# Patient Record
Sex: FEMALE | Race: BLACK OR AFRICAN AMERICAN | NOT HISPANIC OR LATINO
[De-identification: names, ages, dates, MRNs, and addresses within clinical notes are randomized per-mention and may not be internally consistent; named-entity substitution may affect disease eponyms.]

---

## 2017-06-12 ENCOUNTER — MEDICATION RENEWAL (OUTPATIENT)
Age: 67
End: 2017-06-12

## 2018-01-10 ENCOUNTER — APPOINTMENT (OUTPATIENT)
Dept: INTERNAL MEDICINE | Facility: CLINIC | Age: 68
End: 2018-01-10

## 2019-02-05 ENCOUNTER — APPOINTMENT (OUTPATIENT)
Dept: INTERNAL MEDICINE | Facility: CLINIC | Age: 69
End: 2019-02-05
Payer: SELF-PAY

## 2019-02-05 VITALS
HEIGHT: 64.5 IN | BODY MASS INDEX: 32.04 KG/M2 | HEART RATE: 66 BPM | OXYGEN SATURATION: 98 % | DIASTOLIC BLOOD PRESSURE: 90 MMHG | SYSTOLIC BLOOD PRESSURE: 142 MMHG | WEIGHT: 190 LBS

## 2019-02-05 PROCEDURE — 99213 OFFICE O/P EST LOW 20 MIN: CPT

## 2019-02-05 RX ORDER — CHLORHEXIDINE GLUCONATE 4 %
LIQUID (ML) TOPICAL DAILY
Qty: 100 | Refills: 0 | Status: ACTIVE | COMMUNITY
Start: 2019-02-05 | End: 1900-01-01

## 2019-02-06 LAB
ANION GAP SERPL CALC-SCNC: 10 MMOL/L
BUN SERPL-MCNC: 11 MG/DL
CALCIUM SERPL-MCNC: 9.8 MG/DL
CHLORIDE SERPL-SCNC: 106 MMOL/L
CO2 SERPL-SCNC: 28 MMOL/L
CREAT SERPL-MCNC: 1.01 MG/DL
GLUCOSE SERPL-MCNC: 83 MG/DL
HBA1C MFR BLD HPLC: 5.3 %
POTASSIUM SERPL-SCNC: 4.3 MMOL/L
SODIUM SERPL-SCNC: 144 MMOL/L

## 2019-02-06 NOTE — REVIEW OF SYSTEMS
[Joint Pain] : joint pain [Back Pain] : back pain [Negative] : Heme/Lymph [Chest Pain] : no chest pain [Palpitations] : no palpitations

## 2019-02-06 NOTE — HISTORY OF PRESENT ILLNESS
[FreeTextEntry1] : Last seen here in office Sept 2016.  \par Out of meds for BP for quite some time.\par Was recently seen at urgent care - BP at that visit was quite high. \par  [de-identified] : Has had a difficult time since last visit.  Multiple family stressors. Also did not have health insurance for some time. Only recently has it been reinstated, and even now, is not sure.  \par Has only been taking metoprolol since she had some left over.  \par

## 2019-02-06 NOTE — PHYSICAL EXAM
[No Acute Distress] : no acute distress [Well-Appearing] : well-appearing [EOMI] : extraocular movements intact [Normal Outer Ear/Nose] : the outer ears and nose were normal in appearance [Normal Oropharynx] : the oropharynx was normal [No Lymphadenopathy] : no lymphadenopathy [No Respiratory Distress] : no respiratory distress  [Clear to Auscultation] : lungs were clear to auscultation bilaterally [Normal Rate] : normal rate  [Regular Rhythm] : with a regular rhythm [Pedal Pulses Present] : the pedal pulses are present [No Edema] : there was no peripheral edema [Soft] : abdomen soft [Non Tender] : non-tender [Normal Anterior Cervical Nodes] : no anterior cervical lymphadenopathy [No CVA Tenderness] : no CVA  tenderness [No Spinal Tenderness] : no spinal tenderness [Grossly Normal Strength/Tone] : grossly normal strength/tone [No Rash] : no rash [Normal] : normal texture and mobility [Coordination Grossly Intact] : coordination grossly intact [No Focal Deficits] : no focal deficits [Deep Tendon Reflexes (DTR)] : deep tendon reflexes were 2+ and symmetric [Normal Affect] : the affect was normal

## 2019-02-06 NOTE — PLAN
[FreeTextEntry1] : Needs to return for CPE - needs primary care screening and to address other concerns.  \par Deferring ordering these tests pending possible supplementary insurance\par Referred to Main hospital to provide assistance with gaps in her insurance - determine what services she is eligible for and help her obtain additional coverage. \par Scheduled CPE

## 2019-02-26 ENCOUNTER — APPOINTMENT (OUTPATIENT)
Dept: INTERNAL MEDICINE | Facility: CLINIC | Age: 69
End: 2019-02-26

## 2019-04-05 ENCOUNTER — APPOINTMENT (OUTPATIENT)
Dept: INTERNAL MEDICINE | Facility: CLINIC | Age: 69
End: 2019-04-05

## 2019-05-25 ENCOUNTER — RX RENEWAL (OUTPATIENT)
Age: 69
End: 2019-05-25

## 2019-05-25 RX ORDER — MULTIVITAMIN WITH FOLIC ACID 400 MCG
TABLET ORAL
Qty: 100 | Refills: 3 | Status: ACTIVE | COMMUNITY
Start: 2019-05-25 | End: 1900-01-01

## 2019-06-22 ENCOUNTER — RX RENEWAL (OUTPATIENT)
Age: 69
End: 2019-06-22

## 2019-07-05 ENCOUNTER — RX RENEWAL (OUTPATIENT)
Age: 69
End: 2019-07-05

## 2019-07-10 ENCOUNTER — EMERGENCY (EMERGENCY)
Facility: HOSPITAL | Age: 69
LOS: 1 days | Discharge: HOME | End: 2019-07-10
Attending: EMERGENCY MEDICINE | Admitting: EMERGENCY MEDICINE
Payer: MEDICARE

## 2019-07-10 VITALS
RESPIRATION RATE: 18 BRPM | OXYGEN SATURATION: 100 % | HEART RATE: 78 BPM | SYSTOLIC BLOOD PRESSURE: 163 MMHG | DIASTOLIC BLOOD PRESSURE: 87 MMHG | TEMPERATURE: 98 F

## 2019-07-10 PROCEDURE — 12002 RPR S/N/AX/GEN/TRNK2.6-7.5CM: CPT | Mod: RT

## 2019-07-10 PROCEDURE — 99282 EMERGENCY DEPT VISIT SF MDM: CPT | Mod: 25

## 2019-07-12 PROBLEM — I10 ESSENTIAL (PRIMARY) HYPERTENSION: Chronic | Status: ACTIVE | Noted: 2019-07-10

## 2019-07-16 ENCOUNTER — APPOINTMENT (OUTPATIENT)
Dept: INTERNAL MEDICINE | Facility: CLINIC | Age: 69
End: 2019-07-16
Payer: MEDICARE

## 2019-07-16 VITALS
DIASTOLIC BLOOD PRESSURE: 80 MMHG | HEIGHT: 64.5 IN | SYSTOLIC BLOOD PRESSURE: 160 MMHG | BODY MASS INDEX: 32.72 KG/M2 | HEART RATE: 79 BPM | WEIGHT: 194 LBS | OXYGEN SATURATION: 99 %

## 2019-07-16 PROCEDURE — 99213 OFFICE O/P EST LOW 20 MIN: CPT

## 2019-07-19 NOTE — PLAN
[FreeTextEntry1] : f/u with ER as advised to have sutures removed \par check with PCP Dr Bell to see if TD was given recently, otherwise will need Td\par CPE with  Dr Bell at next available appt

## 2019-07-19 NOTE — HISTORY OF PRESENT ILLNESS
[FreeTextEntry8] : Sustained laceration on the right calf over a week ago with sutures in place.\par Did not get tetanus as she believes she got Td in 2/5/2019 by her PCP.\par Unable to confirm on records and will refer to PCP to see if Td was given.

## 2019-07-19 NOTE — PHYSICAL EXAM
[Well Nourished] : well nourished [de-identified] : 4cm laceration on medial right calf with sutres in place

## 2019-07-31 ENCOUNTER — EMERGENCY (EMERGENCY)
Facility: HOSPITAL | Age: 69
LOS: 1 days | Discharge: HOME | End: 2019-07-31
Admitting: EMERGENCY MEDICINE
Payer: MEDICARE

## 2019-07-31 VITALS
SYSTOLIC BLOOD PRESSURE: 161 MMHG | RESPIRATION RATE: 16 BRPM | HEART RATE: 66 BPM | OXYGEN SATURATION: 100 % | TEMPERATURE: 98 F | DIASTOLIC BLOOD PRESSURE: 68 MMHG

## 2019-07-31 PROCEDURE — 99283 EMERGENCY DEPT VISIT LOW MDM: CPT

## 2019-07-31 RX ORDER — CEPHALEXIN 500 MG
1 CAPSULE ORAL
Qty: 10 | Refills: 0
Start: 2019-07-31 | End: 2019-08-04

## 2019-07-31 NOTE — ED PROVIDER NOTE - OBJECTIVE STATEMENT
67 y/o F PMHx HTN here for suture removal. Pt states she fell on 7/10/19 and had 6 stitches placed to the R shin. Was told to return in 1-2 weeks for suture removal, but states her daughter was unable to bring her sooner. This past week, pt developed redness and purulent drainage to the wound, so went to [facility] and was diagnosed w/ cellulitis. Pt has since started taking Keflex, which helped improve the pain and redness. Took a total of 5 days, last dose this morning. Denies fevers, chills, calf pain or any other complaints. Able to ambulate.

## 2019-07-31 NOTE — ED PROVIDER NOTE - NSFOLLOWUPINSTRUCTIONS_ED_ALL_ED_FT
Follow up with your PMD within 48-72 hours for wound check. Keep wound covered and dry, clean with soap and water daily.  Apply bacitracin and cover.  Take Keflex 1 tablet twice a day for 5 more days. Any increased pain, redness, streaking (red lines), swelling, fever, chills return to ER.

## 2019-07-31 NOTE — ED PROVIDER NOTE - CLINICAL SUMMARY MEDICAL DECISION MAKING FREE TEXT BOX
69 y/o F here for suture removal of a laceration that was sutured 3 weeks ago, reports that she was treated w/ keflex for 5 days (last dose this am) for cellulitis. States the wound has already improved. On exam minimal dehiscence noted to distal point of laceration, still has mild surrounding erythema. Will treat w/ an additional 5 days of keflex, pmd follow up until resolution.

## 2019-07-31 NOTE — ED PROVIDER NOTE - PHYSICAL EXAMINATION
R shin w/ a 4cm laceration w/ 6 simple interrupted sutures in place, distal point of laceration w/ mild dehiscence but no active drainage, surrounding 1 cm area of erythema and warmth, no red streaking, no calf tenderness

## 2019-07-31 NOTE — ED PROVIDER NOTE - NS ED ROS FT
ROS:  GENERAL: No fever, no chills  EYES: no change in vision  HEENT: no trouble swallowing, no trouble speaking  CARDIAC: no chest pain  PULMONARY: no cough, no shortness of breath  GI: no abdominal pain, no nausea, no vomiting, no diarrhea, no constipation  : No dysuria, no frequency, no change in appearance, or odor of urine  SKIN: +healed laceration, +redness  NEURO: no headache, no weakness  MSK: No joint pain

## 2019-08-29 ENCOUNTER — APPOINTMENT (OUTPATIENT)
Dept: INTERNAL MEDICINE | Facility: CLINIC | Age: 69
End: 2019-08-29

## 2019-09-10 ENCOUNTER — APPOINTMENT (OUTPATIENT)
Dept: INTERNAL MEDICINE | Facility: CLINIC | Age: 69
End: 2019-09-10

## 2019-09-12 ENCOUNTER — RX RENEWAL (OUTPATIENT)
Age: 69
End: 2019-09-12

## 2019-10-13 ENCOUNTER — RX RENEWAL (OUTPATIENT)
Age: 69
End: 2019-10-13

## 2020-03-10 ENCOUNTER — APPOINTMENT (OUTPATIENT)
Dept: INTERNAL MEDICINE | Facility: CLINIC | Age: 70
End: 2020-03-10

## 2020-04-23 ENCOUNTER — APPOINTMENT (OUTPATIENT)
Dept: INTERNAL MEDICINE | Facility: CLINIC | Age: 70
End: 2020-04-23

## 2020-04-23 ENCOUNTER — APPOINTMENT (OUTPATIENT)
Dept: INTERNAL MEDICINE | Facility: CLINIC | Age: 70
End: 2020-04-23
Payer: MEDICARE

## 2020-04-23 VITALS — SYSTOLIC BLOOD PRESSURE: 137 MMHG | RESPIRATION RATE: 14 BRPM | DIASTOLIC BLOOD PRESSURE: 72 MMHG | HEART RATE: 64 BPM

## 2020-04-23 DIAGNOSIS — J45.909 UNSPECIFIED ASTHMA, UNCOMPLICATED: ICD-10-CM

## 2020-04-23 PROCEDURE — 99213 OFFICE O/P EST LOW 20 MIN: CPT | Mod: 95

## 2020-04-23 NOTE — PHYSICAL EXAM
[No Acute Distress] : no acute distress [Well-Appearing] : well-appearing [Normal Sclera/Conjunctiva] : normal sclera/conjunctiva [EOMI] : extraocular movements intact [Normal Outer Ear/Nose] : the outer ears and nose were normal in appearance [No Respiratory Distress] : no respiratory distress  [No Accessory Muscle Use] : no accessory muscle use [Normal Rate] : normal rate  [Grossly Normal Strength/Tone] : grossly normal strength/tone [No Rash] : no rash [Coordination Grossly Intact] : coordination grossly intact [No Focal Deficits] : no focal deficits [Normal Affect] : the affect was normal [Normal Insight/Judgement] : insight and judgment were intact

## 2020-04-23 NOTE — REVIEW OF SYSTEMS
[Wheezing] : wheezing [Cough] : cough [Joint Pain] : joint pain [Back Pain] : back pain [Negative] : Psychiatric [Fever] : no fever [Chills] : no chills [Dyspnea on Exertion] : no dyspnea on exertion [FreeTextEntry9] : see hpi

## 2020-04-23 NOTE — HISTORY OF PRESENT ILLNESS
[Home] : at home, [unfilled] , at the time of the visit. [Patient] : the patient [Medical Office: ([facility])___] : at the medical office located in  [de-identified] : Visit requested by patient to address worsening asthma]. An in-person visit is not advisable due to the current stay-at-home directive across [facility] during the COVID-19 State of Emergency, so TeleHealth visit was initiated and is appropriate to care for this patient.  \katia \katia Reports she has been wheezing since December. + dry cough- worse at night. No fever. No CP or chest tightness, no JONES. She has been using albuterol MDI.  Has not had refill of symbacort since the last time she was in the office which was last July. \katia Has not been sheltering as home.  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 She does have PPE but there are COVID + and COVID negative areas of the facility.  \katia Also reports her knee and back bothering her somewhat chronically - she has been using Tylenol PRN - would like xrays but prefers to wait until the worst of the pandemic has passed before proceeding with w/u.

## 2020-04-23 NOTE — DATA REVIEWED
[FreeTextEntry1] : has been tracking BP:\par 137/72\par 147/77\par 142/77\par \par over past few days

## 2020-04-23 NOTE — PLAN
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

## 2020-05-05 ENCOUNTER — APPOINTMENT (OUTPATIENT)
Dept: INTERNAL MEDICINE | Facility: CLINIC | Age: 70
End: 2020-05-05
Payer: MEDICARE

## 2020-05-05 DIAGNOSIS — M25.569 PAIN IN UNSPECIFIED KNEE: ICD-10-CM

## 2020-05-05 PROCEDURE — 99213 OFFICE O/P EST LOW 20 MIN: CPT | Mod: 95

## 2020-05-05 NOTE — PHYSICAL EXAM
[Well-Appearing] : well-appearing [No Acute Distress] : no acute distress [EOMI] : extraocular movements intact [Normal Sclera/Conjunctiva] : normal sclera/conjunctiva [Normal Outer Ear/Nose] : the outer ears and nose were normal in appearance [No Accessory Muscle Use] : no accessory muscle use [No Rash] : no rash [No Respiratory Distress] : no respiratory distress  [Coordination Grossly Intact] : coordination grossly intact [Normal Affect] : the affect was normal

## 2020-05-05 NOTE — REVIEW OF SYSTEMS
[Joint Pain] : joint pain [Back Pain] : back pain [Negative] : Heme/Lymph [FreeTextEntry6] : see hpi - less wheezing [FreeTextEntry9] : see hpi - both knee and back pain have improved significantly since starting topical NSAID

## 2020-05-05 NOTE — HISTORY OF PRESENT ILLNESS
[Home] : at home, [unfilled] , at the time of the visit. [Medical Office: ([facility])___] : at the medical office located in  [Other:____] : [unfilled] [Patient] : the patient [de-identified] : Visit scheduled by patient to f/u on asthma, joint pains. An in-person visit is not advisable due to the current stay-at-home directive across [facility] during the COVID-19 State of Emergency, so TeleHealth visit was initiated and is appropriate to care for this patient.  \par  \par Last visit, had been having increased wheezing.  Works in SNF with some COVID + patients.  Had not been using Symbicort so it was renewed. \par also having LBP and knee pain - sent in diclofenac to try\par has been monitoring her BP's\par \par Ms Ceja reports that since she started symbicort, her breathing has been much, much better. Less fatigue after she works a shift and needing to use Ventolin less often. \par Also reports that the diclofenac has helped with her knee pain and back pain significantly.  If she uses it at bedtime, wakes up without pain, and noticing she can stand up straighter at work and get around easier.\par

## 2020-08-04 ENCOUNTER — APPOINTMENT (OUTPATIENT)
Dept: INTERNAL MEDICINE | Facility: CLINIC | Age: 70
End: 2020-08-04
Payer: MEDICARE

## 2020-08-04 VITALS — DIASTOLIC BLOOD PRESSURE: 85 MMHG | SYSTOLIC BLOOD PRESSURE: 140 MMHG

## 2020-08-04 DIAGNOSIS — M19.90 UNSPECIFIED OSTEOARTHRITIS, UNSPECIFIED SITE: ICD-10-CM

## 2020-08-04 PROCEDURE — 99443: CPT

## 2020-08-04 NOTE — HISTORY OF PRESENT ILLNESS
[Home] : at home, [unfilled] , at the time of the visit. [Verbal consent obtained from patient] : the patient, [unfilled] [de-identified] : She is happy to report that her BP has been really good lately.  She had an interview with her ins co and they will now be providing mail order coverage for all 4 BP medications without any cost to her. She is quite pleased that her access to her medication will not be an issue anymore. 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 Unfortunately, multiple residents of the facility passed of COVID infection - she thinks 19 in all.  A few employees got sick as well but they are no longer working there. \par She says everyone has been negative for the past 6 weeks.

## 2020-09-10 ENCOUNTER — RX RENEWAL (OUTPATIENT)
Age: 70
End: 2020-09-10

## 2020-10-13 ENCOUNTER — APPOINTMENT (OUTPATIENT)
Dept: OBGYN | Facility: CLINIC | Age: 70
End: 2020-10-13
Payer: MEDICARE

## 2020-10-13 ENCOUNTER — LABORATORY RESULT (OUTPATIENT)
Age: 70
End: 2020-10-13

## 2020-10-13 VITALS
SYSTOLIC BLOOD PRESSURE: 142 MMHG | DIASTOLIC BLOOD PRESSURE: 90 MMHG | HEIGHT: 64.5 IN | WEIGHT: 198 LBS | BODY MASS INDEX: 33.39 KG/M2

## 2020-10-13 DIAGNOSIS — Z01.419 ENCOUNTER FOR GYNECOLOGICAL EXAMINATION (GENERAL) (ROUTINE) W/OUT ABNORMAL FINDINGS: ICD-10-CM

## 2020-10-13 DIAGNOSIS — R92.8 OTHER ABNORMAL AND INCONCLUSIVE FINDINGS ON DIAGNOSTIC IMAGING OF BREAST: ICD-10-CM

## 2020-10-13 DIAGNOSIS — Z87.898 PERSONAL HISTORY OF OTHER SPECIFIED CONDITIONS: ICD-10-CM

## 2020-10-13 DIAGNOSIS — Z12.11 ENCOUNTER FOR SCREENING FOR MALIGNANT NEOPLASM OF COLON: ICD-10-CM

## 2020-10-13 DIAGNOSIS — S81.811S LACERATION W/OUT FOREIGN BODY, RIGHT LOWER LEG, SEQUELA: ICD-10-CM

## 2020-10-13 DIAGNOSIS — R68.89 OTHER GENERAL SYMPTOMS AND SIGNS: ICD-10-CM

## 2020-10-13 PROCEDURE — 99397 PER PM REEVAL EST PAT 65+ YR: CPT

## 2020-10-13 RX ORDER — DICLOFENAC SODIUM 10 MG/G
1 GEL TOPICAL
Qty: 1 | Refills: 2 | Status: DISCONTINUED | COMMUNITY
Start: 2020-04-23 | End: 2020-10-13

## 2020-10-13 NOTE — PLAN
[FreeTextEntry1] : Previous breast biopsy in 2015 -needs mammo follow up\par BP elevated today-has not yet taken medication\par Cystocele large -will send to urogyn for pessary evaluation as she has had good success in the past. However , it might have gotten larger and I do not know how well it will stay in

## 2020-10-13 NOTE — HISTORY OF PRESENT ILLNESS
[Patient reported mammogram was abnormal] : Patient reported mammogram was abnormal [Patient reported PAP Smear was normal] : Patient reported PAP Smear was normal [FreeTextEntry1] : Patient with prolapse \par She used pessary\par but has stopped for the past 3 months\par It falls out on its own and she might want a new one  [Mammogramdate] : 2015 [TextBox_19] : negative breast biopsy at that time and no follow up  [PapSmeardate] : 2015 [ColonoscopyDate] : 2013

## 2020-10-13 NOTE — PHYSICAL EXAM
[Appropriately responsive] : appropriately responsive [Alert] : alert [No Acute Distress] : no acute distress [No Lymphadenopathy] : no lymphadenopathy [Regular Rate Rhythm] : regular rate rhythm [No Murmurs] : no murmurs [Clear to Auscultation B/L] : clear to auscultation bilaterally [Soft] : soft [Non-tender] : non-tender [Non-distended] : non-distended [No HSM] : No HSM [No Lesions] : no lesions [No Mass] : no mass [Oriented x3] : oriented x3 [Examination Of The Breasts] : a normal appearance [No Masses] : no breast masses were palpable [Labia Majora] : normal [Labia Minora] : normal [Cystocele] : a cystocele [Normal] : normal [Uterine Adnexae] : normal [FreeTextEntry4] : Large cystocele visible in the introitus

## 2020-10-28 ENCOUNTER — NON-APPOINTMENT (OUTPATIENT)
Age: 70
End: 2020-10-28

## 2020-11-03 LAB — CYTOLOGY CVX/VAG DOC THIN PREP: ABNORMAL

## 2020-11-09 ENCOUNTER — NON-APPOINTMENT (OUTPATIENT)
Age: 70
End: 2020-11-09

## 2020-11-10 ENCOUNTER — APPOINTMENT (OUTPATIENT)
Dept: INTERNAL MEDICINE | Facility: CLINIC | Age: 70
End: 2020-11-10
Payer: MEDICARE

## 2020-11-10 ENCOUNTER — NON-APPOINTMENT (OUTPATIENT)
Age: 70
End: 2020-11-10

## 2020-11-10 VITALS
BODY MASS INDEX: 34.73 KG/M2 | HEART RATE: 62 BPM | OXYGEN SATURATION: 98 % | HEIGHT: 63 IN | SYSTOLIC BLOOD PRESSURE: 144 MMHG | WEIGHT: 196 LBS | DIASTOLIC BLOOD PRESSURE: 92 MMHG

## 2020-11-10 DIAGNOSIS — Z20.828 CONTACT WITH AND (SUSPECTED) EXPOSURE TO OTHER VIRAL COMMUNICABLE DISEASES: ICD-10-CM

## 2020-11-10 DIAGNOSIS — Z71.89 OTHER SPECIFIED COUNSELING: ICD-10-CM

## 2020-11-10 DIAGNOSIS — G47.62 SLEEP RELATED LEG CRAMPS: ICD-10-CM

## 2020-11-10 DIAGNOSIS — M54.5 LOW BACK PAIN: ICD-10-CM

## 2020-11-10 DIAGNOSIS — R09.82 POSTNASAL DRIP: ICD-10-CM

## 2020-11-10 PROCEDURE — G0444 DEPRESSION SCREEN ANNUAL: CPT | Mod: 59

## 2020-11-10 PROCEDURE — 99072 ADDL SUPL MATRL&STAF TM PHE: CPT

## 2020-11-10 PROCEDURE — 99497 ADVNCD CARE PLAN 30 MIN: CPT | Mod: 33

## 2020-11-10 PROCEDURE — G0439: CPT

## 2020-11-10 PROCEDURE — G0442 ANNUAL ALCOHOL SCREEN 15 MIN: CPT | Mod: 59

## 2020-11-10 PROCEDURE — 93000 ELECTROCARDIOGRAM COMPLETE: CPT | Mod: 59

## 2020-11-10 NOTE — ASSESSMENT
[FreeTextEntry1] : Discussed advanced directives, Health Care Proxy, and goals of care during visit. \par \par Annual alcohol screen completed this visit. Alcohol use within healthy limits.  Healthy drinking guidelines shared with patient (Female and male overage 65 no more than 3 SSD on any day, no more than 7 per week). Positive reinforcement provided given patient currently within healthy guidelines. \par \par Annual depression screen completed and reviewed.  Screening not suggestive of diagnosis of MDD.

## 2020-11-10 NOTE — REVIEW OF SYSTEMS
[Incontinence] : incontinence [Frequency] : frequency [Joint Pain] : joint pain [Back Pain] : back pain [Negative] : Heme/Lymph [FreeTextEntry6] : coughs at night - feels tickle in her throat when she lies down, with some phlegm intermittently during the night

## 2020-11-10 NOTE — PHYSICAL EXAM
[No Acute Distress] : no acute distress [Well-Appearing] : well-appearing [Normal Sclera/Conjunctiva] : normal sclera/conjunctiva [PERRL] : pupils equal round and reactive to light [EOMI] : extraocular movements intact [Normal Outer Ear/Nose] : the outer ears and nose were normal in appearance [Normal Oropharynx] : the oropharynx was normal [No Lymphadenopathy] : no lymphadenopathy [Supple] : supple [No Respiratory Distress] : no respiratory distress  [No Accessory Muscle Use] : no accessory muscle use [Clear to Auscultation] : lungs were clear to auscultation bilaterally [Regular Rhythm] : with a regular rhythm [Normal S1, S2] : normal S1 and S2 [No Murmur] : no murmur heard [Pedal Pulses Present] : the pedal pulses are present [No Edema] : there was no peripheral edema [Normal Appearance] : normal in appearance [No Masses] : no palpable masses [No Axillary Lymphadenopathy] : no axillary lymphadenopathy [Soft] : abdomen soft [Non Tender] : non-tender [Normal Bowel Sounds] : normal bowel sounds [Normal Axillary Nodes] : no axillary lymphadenopathy [Normal Posterior Cervical Nodes] : no posterior cervical lymphadenopathy [Normal Anterior Cervical Nodes] : no anterior cervical lymphadenopathy [No CVA Tenderness] : no CVA  tenderness [No Spinal Tenderness] : no spinal tenderness [No Joint Swelling] : no joint swelling [Grossly Normal Strength/Tone] : grossly normal strength/tone [No Rash] : no rash [Normal] : normal texture and mobility [Coordination Grossly Intact] : coordination grossly intact [Deep Tendon Reflexes (DTR)] : deep tendon reflexes were 2+ and symmetric [Normal Affect] : the affect was normal [Normal Insight/Judgement] : insight and judgment were intact [de-identified] : HR slow [de-identified] : no skin changes

## 2020-11-10 NOTE — HISTORY OF PRESENT ILLNESS
[de-identified] : doing well - has not been sick since she has been very careful at work\par Has bladder issues as well as urge incontinence - has appt to see urologist on 11/26 RE pessary\par to make appt for mammo\par \par c/o pain in waist, shoulder, and leg cramps - she is concerned may have to retire soon due to the waist and shoulder pain.  The leg cramps are nocturnal only - bilateral calves. Can be very painful - occasionally daughter has to get up in the middle of the night to massage them away.\par \par

## 2020-11-10 NOTE — DISCUSSION/SUMMARY
[Subsequent Annual Wellness] : Subsequent Annual Wellness Visit [Preventive Exam & Counseling Completed] : with preventive exam as well as age and risk appropriate counseling completed [EKG] : EKG recommended [Healthy Diet] : counseling was given on maintaining a healthy diet [Healthy Weight] : counseling was given on maintaining a healthy weight [Improve Physical Activity] : counseling was given on ways to improve physical activity [Blood Glucose] : due for blood glucose [Screening Current] : screening is current [Screening Mammogram] : due for a screening mammogram [Calcium and Vitamin D Intake] : counseling was given on obtaining adequate amounts of calcium and vitamin D on a daily basis [Regular Weightbearing Exercise] : counseling was given on the importance of regular weightbearing exercise [Screening Not Indicated] : screening not indicated [Ophthalmologist Referral] : ophthalmologist referral [YAMILETH] : due for YAMILETH [Influenza UTD This Year] : influenza vaccine is up to date this year [Influenza Recommended Annually] : influenza vaccination is recommended annually [Lifetime Vaccine Completed] : the lifetime pneumococcal vaccine has been completed [Paperwork & Instructions Given] : paperwork and instructions were given to the patient [Encouraged to F/U to Discuss Questions/Decisions] : ~he/she~ was encouraged to follow-up with me to discuss ~his/her~ questions and/or decisions [Patient] : plan discussed with the patient [Follow-Up in ___] : follow-up visit needed in [unfilled]

## 2020-11-14 LAB
ALBUMIN SERPL ELPH-MCNC: 4.3 G/DL
ALP BLD-CCNC: 77 U/L
ALT SERPL-CCNC: 12 U/L
ANION GAP SERPL CALC-SCNC: 12 MMOL/L
AST SERPL-CCNC: 18 U/L
BASOPHILS # BLD AUTO: 0.03 K/UL
BASOPHILS NFR BLD AUTO: 0.5 %
BILIRUB SERPL-MCNC: 0.5 MG/DL
BUN SERPL-MCNC: 12 MG/DL
CALCIUM SERPL-MCNC: 10.1 MG/DL
CHLORIDE SERPL-SCNC: 102 MMOL/L
CHOLEST SERPL-MCNC: 186 MG/DL
CO2 SERPL-SCNC: 28 MMOL/L
CREAT SERPL-MCNC: 0.89 MG/DL
EOSINOPHIL # BLD AUTO: 0.22 K/UL
EOSINOPHIL NFR BLD AUTO: 4 %
ESTIMATED AVERAGE GLUCOSE: 114 MG/DL
GLUCOSE SERPL-MCNC: 83 MG/DL
HBA1C MFR BLD HPLC: 5.6 %
HCT VFR BLD CALC: 39.3 %
HDLC SERPL-MCNC: 54 MG/DL
HGB BLD-MCNC: 13 G/DL
IMM GRANULOCYTES NFR BLD AUTO: 0.4 %
LDLC SERPL CALC-MCNC: 120 MG/DL
LYMPHOCYTES # BLD AUTO: 2.07 K/UL
LYMPHOCYTES NFR BLD AUTO: 37.6 %
MAN DIFF?: NORMAL
MCHC RBC-ENTMCNC: 29.8 PG
MCHC RBC-ENTMCNC: 33.1 GM/DL
MCV RBC AUTO: 90.1 FL
MONOCYTES # BLD AUTO: 0.47 K/UL
MONOCYTES NFR BLD AUTO: 8.5 %
NEUTROPHILS # BLD AUTO: 2.69 K/UL
NEUTROPHILS NFR BLD AUTO: 49 %
NONHDLC SERPL-MCNC: 132 MG/DL
PLATELET # BLD AUTO: 236 K/UL
POTASSIUM SERPL-SCNC: 3.9 MMOL/L
PROT SERPL-MCNC: 7.5 G/DL
RBC # BLD: 4.36 M/UL
RBC # FLD: 13.2 %
SARS-COV-2 IGG SERPL IA-ACNC: 0.08 INDEX
SARS-COV-2 IGG SERPL QL IA: NEGATIVE
SODIUM SERPL-SCNC: 142 MMOL/L
TRIGL SERPL-MCNC: 61 MG/DL
TSH SERPL-ACNC: 1.05 UIU/ML
WBC # FLD AUTO: 5.5 K/UL

## 2020-11-24 ENCOUNTER — APPOINTMENT (OUTPATIENT)
Dept: UROGYNECOLOGY | Facility: CLINIC | Age: 70
End: 2020-11-24

## 2020-11-30 ENCOUNTER — APPOINTMENT (OUTPATIENT)
Dept: OBGYN | Facility: CLINIC | Age: 70
End: 2020-11-30

## 2020-12-23 PROBLEM — Z01.419 ENCOUNTER FOR GYNECOLOGICAL EXAMINATION: Status: RESOLVED | Noted: 2020-10-13 | Resolved: 2020-12-23

## 2021-01-12 ENCOUNTER — APPOINTMENT (OUTPATIENT)
Dept: OBGYN | Facility: CLINIC | Age: 71
End: 2021-01-12
Payer: MEDICARE

## 2021-01-12 VITALS
SYSTOLIC BLOOD PRESSURE: 146 MMHG | WEIGHT: 201 LBS | TEMPERATURE: 98 F | HEIGHT: 63 IN | DIASTOLIC BLOOD PRESSURE: 80 MMHG | BODY MASS INDEX: 35.61 KG/M2

## 2021-01-12 DIAGNOSIS — R87.610 ATYPICAL SQUAMOUS CELLS OF UNDETERMINED SIGNIFICANCE ON CYTOLOGIC SMEAR OF CERVIX (ASC-US): ICD-10-CM

## 2021-01-12 DIAGNOSIS — R87.810 ATYPICAL SQUAMOUS CELLS OF UNDETERMINED SIGNIFICANCE ON CYTOLOGIC SMEAR OF CERVIX (ASC-US): ICD-10-CM

## 2021-01-12 PROCEDURE — 57452 EXAM OF CERVIX W/SCOPE: CPT

## 2021-01-12 PROCEDURE — 99072 ADDL SUPL MATRL&STAF TM PHE: CPT

## 2021-01-12 NOTE — PROCEDURE
[Colposcopy] : Colposcopy  [Time out performed] : Pre-procedure time out performed.  Patient's name, date of birth and procedure confirmed. [Consent Obtained] : Consent obtained [Risks] : risks [Benefits] : benefits [Alternatives] : alternatives [Patient] : patient [Infection] : infection [Bleeding] : bleeding [Allergic Reaction] : allergic reaction [ASCUS] : ASCUS [HPV High Risk] : HPV high risk [Colposcopy Adequate] : colposcopy adequate [Pap Performed] : pap not performed [SCI Fully Visualized] : SCI fully visualized [ECC Performed] : ECC performed [No Abnormalities] : no abnormalities [Biopsy] : biopsy taken [Hemostasis Obtained] : Hemostasis obtained [Tolerated Well] : the patient tolerated the procedure well [de-identified] : positive 18/45 [de-identified] : 1` [de-identified] : 11 o'clock

## 2021-01-21 ENCOUNTER — NON-APPOINTMENT (OUTPATIENT)
Age: 71
End: 2021-01-21

## 2021-01-25 ENCOUNTER — NON-APPOINTMENT (OUTPATIENT)
Age: 71
End: 2021-01-25

## 2021-01-25 LAB — CORE LAB BIOPSY: NORMAL

## 2021-01-26 ENCOUNTER — APPOINTMENT (OUTPATIENT)
Dept: INTERNAL MEDICINE | Facility: CLINIC | Age: 71
End: 2021-01-26
Payer: MEDICARE

## 2021-01-26 VITALS — SYSTOLIC BLOOD PRESSURE: 140 MMHG | DIASTOLIC BLOOD PRESSURE: 88 MMHG

## 2021-01-26 VITALS
BODY MASS INDEX: 34.55 KG/M2 | SYSTOLIC BLOOD PRESSURE: 190 MMHG | DIASTOLIC BLOOD PRESSURE: 90 MMHG | OXYGEN SATURATION: 98 % | WEIGHT: 195 LBS | HEIGHT: 63 IN | HEART RATE: 64 BPM

## 2021-01-26 DIAGNOSIS — Z71.89 OTHER SPECIFIED COUNSELING: ICD-10-CM

## 2021-01-26 PROCEDURE — 99072 ADDL SUPL MATRL&STAF TM PHE: CPT

## 2021-01-26 PROCEDURE — 99214 OFFICE O/P EST MOD 30 MIN: CPT

## 2021-01-26 RX ORDER — HYDROCHLOROTHIAZIDE 25 MG/1
25 TABLET ORAL
Qty: 90 | Refills: 3 | Status: DISCONTINUED | COMMUNITY
Start: 2019-02-05 | End: 2021-01-26

## 2021-01-26 NOTE — REVIEW OF SYSTEMS
[Back Pain] : back pain [Negative] : Heme/Lymph [FreeTextEntry9] : see hpi [de-identified] : see hpi

## 2021-01-26 NOTE — HISTORY OF PRESENT ILLNESS
[FreeTextEntry1] : BP, HLD [de-identified] : Last visit, decreased her beta blocker due to bradycardia. Here for blood pressure check. She has been taking 50 mg of metoprolol (1/2 pill) since last visit.  she ays when she checks, her SBP still runs higher then desired - in the 140-150 range. \par \par c/o back pain at the level of her waist.  Chronic - causing difficulty at work - she finds it hard to stand for a long periods of time which is making her job at [employer] difficult.\par Reports recent visit to Derm - skin bx on hand  - benign

## 2021-01-26 NOTE — PHYSICAL EXAM
[Well-Appearing] : well-appearing [No Acute Distress] : no acute distress [EOMI] : extraocular movements intact [No Respiratory Distress] : no respiratory distress  [Clear to Auscultation] : lungs were clear to auscultation bilaterally [Regular Rhythm] : with a regular rhythm [Normal S1, S2] : normal S1 and S2 [Soft] : abdomen soft [Non Tender] : non-tender [Normal Anterior Cervical Nodes] : no anterior cervical lymphadenopathy [No CVA Tenderness] : no CVA  tenderness [No Spinal Tenderness] : no spinal tenderness [Grossly Normal Strength/Tone] : grossly normal strength/tone [No Rash] : no rash [No Focal Deficits] : no focal deficits [Deep Tendon Reflexes (DTR)] : deep tendon reflexes were 2+ and symmetric [Normal Affect] : the affect was normal

## 2021-01-26 NOTE — ASSESSMENT
[FreeTextEntry1] : covid vaccine as soon as available - she can likely schedule through her workplace

## 2021-02-02 ENCOUNTER — NON-APPOINTMENT (OUTPATIENT)
Age: 71
End: 2021-02-02

## 2021-02-02 ENCOUNTER — APPOINTMENT (OUTPATIENT)
Dept: PAIN MANAGEMENT | Facility: CLINIC | Age: 71
End: 2021-02-02
Payer: MEDICARE

## 2021-02-02 DIAGNOSIS — M79.10 MYALGIA, UNSPECIFIED SITE: ICD-10-CM

## 2021-02-02 PROCEDURE — 99204 OFFICE O/P NEW MOD 45 MIN: CPT | Mod: 95

## 2021-02-02 NOTE — CONSULT LETTER
[Dear  ___] : Dear  [unfilled], [Consult Letter:] : I had the pleasure of evaluating your patient, [unfilled]. [Please see my note below.] : Please see my note below. [Consult Closing:] : Thank you very much for allowing me to participate in the care of this patient.  If you have any questions, please do not hesitate to contact me. [Sincerely,] : Sincerely, [FreeTextEntry3] : Harpreet Schmitz MD\par

## 2021-02-02 NOTE — HISTORY OF PRESENT ILLNESS
[FreeTextEntry1] : \par Reason for Telehealth visit: Back pain\par \par This call took place with [facility] room on video. The patient and Dr. Schmitz were both able to see each other and communicate through video. Greater then 50% of the time spent in the encounter involved counseling and coordination of care.  70 yof presents for consultation with back pain. She reports one year of severe back pain. Pain radiates into the legs bilaterally at night, right greater then left. There is cramping and weakness. No relief w/ medications. Quality of life is impaired. Reports significant discomfort around her waist. Pain is improved with sitting. Worst with walking. Is aching and cramping.\par \par Time spent on visit: 30 minutes\par \par

## 2021-02-02 NOTE — ASSESSMENT
[FreeTextEntry1] : 70 yof presents for consultation with back pain. She reports one year of severe back pain. Pain radiates into the legs bilaterally at night, right greater then left. There is cramping and weakness. No relief w/ medications. Quality of life is impaired. The patient has failed to have relief with over six weeks of physical therapy within the last three months and all medications. GIven their failure to improve with all other conservative measures recommend MRI lumbar spine. Patient will return to review imaging and plan for potential intervention. Physical therapy. Start gabapentin 300 mg QHS. RTC to review imaging and consider intervention, likely ESEQUIEL. \par

## 2021-02-08 ENCOUNTER — APPOINTMENT (OUTPATIENT)
Dept: PAIN MANAGEMENT | Facility: CLINIC | Age: 71
End: 2021-02-08
Payer: MEDICARE

## 2021-02-08 PROCEDURE — 99443: CPT

## 2021-02-10 ENCOUNTER — APPOINTMENT (OUTPATIENT)
Dept: UROGYNECOLOGY | Facility: CLINIC | Age: 71
End: 2021-02-10
Payer: MEDICARE

## 2021-02-10 VITALS
BODY MASS INDEX: 33.12 KG/M2 | HEIGHT: 64 IN | SYSTOLIC BLOOD PRESSURE: 166 MMHG | WEIGHT: 194 LBS | DIASTOLIC BLOOD PRESSURE: 95 MMHG | HEART RATE: 59 BPM | TEMPERATURE: 98.6 F

## 2021-02-10 VITALS — DIASTOLIC BLOOD PRESSURE: 89 MMHG | SYSTOLIC BLOOD PRESSURE: 175 MMHG

## 2021-02-10 DIAGNOSIS — R35.0 FREQUENCY OF MICTURITION: ICD-10-CM

## 2021-02-10 LAB
BILIRUB UR QL STRIP: NORMAL
CLARITY UR: CLEAR
COLLECTION METHOD: NORMAL
GLUCOSE UR-MCNC: NORMAL
HCG UR QL: 0.2 EU/DL
HGB UR QL STRIP.AUTO: NORMAL
KETONES UR-MCNC: NORMAL
LEUKOCYTE ESTERASE UR QL STRIP: NORMAL
NITRITE UR QL STRIP: NORMAL
PH UR STRIP: 7
PROT UR STRIP-MCNC: NORMAL
SP GR UR STRIP: 1.02

## 2021-02-10 PROCEDURE — 81003 URINALYSIS AUTO W/O SCOPE: CPT | Mod: QW

## 2021-02-10 PROCEDURE — 99204 OFFICE O/P NEW MOD 45 MIN: CPT | Mod: 25

## 2021-02-10 PROCEDURE — 99072 ADDL SUPL MATRL&STAF TM PHE: CPT

## 2021-02-10 PROCEDURE — 51701 INSERT BLADDER CATHETER: CPT

## 2021-02-10 NOTE — HISTORY OF PRESENT ILLNESS
[Vaginal Wall Prolapse] : no [Rectal Prolapse] : no [Unable To Restrain Bowel Movement] : no [Urinary Frequency] : no [] : years ago [Urinary Tract Infection] : no [x3+] : nocturia three or more  times a night [FreeTextEntry3] : daily -  in order to help urinate [FreeTextEntry5] : daily [de-identified] : 3 [de-identified] : has used a pessary in the past which she used for a little less than 2 years - last used 6 months ago  - pt was doing self care [de-identified] : daily [de-identified] : with prolapse [de-identified] : + sexually acrive [FreeTextEntry1] : chart reviewed pt last seen by urogyn many years ago. \par ROS as per questionnaire.\par

## 2021-02-10 NOTE — PHYSICAL EXAM
[No Acute Distress] : in no acute distress [Well developed] : well developed [Well Nourished] : ~L well nourished [Normal Mood/Affect] : mood and affect are normal [Respirations regular] : ~T respiratory rate was regular [No Edema] : ~T edema was not present [Warm and Dry] : was warm and dry to touch [Normal Gait] : gait was normal [Vulvar Atrophy] : vulvar atrophy [Normal Appearance] : general appearance was normal [Atrophy] : atrophy [Cystocele] : a cystocele [Uterine Prolapse] : uterine prolapse [Aa ____] : Aa [unfilled] [Ba ____] : Ba [unfilled] [C ____] : C [unfilled] [GH ____] : GH [unfilled] [PB ____] : PB [unfilled] [TVL ____] : TVL  [unfilled] [Ap ____] : Ap [unfilled] [Bp ____] : Bp [unfilled] [D ____] : D [unfilled] [Normal] : normal [Uterine Adnexae] : were not tender and not enlarged [Post Void Residual ____ml] : post void residual was [unfilled] ml [Normal rectal exam] : was normal [Anxiety] : patient is not anxious [Mass (___ Cm)] : no ~M [unfilled] abdominal mass was palpated [Tenderness] : ~T no ~M abdominal tenderness observed [Distended] : not distended [Inguinal LAD] : no adenopathy was noted in the inguinal lymph nodes [FreeTextEntry3] : + hypermobility

## 2021-02-10 NOTE — DISCUSSION/SUMMARY
[FreeTextEntry1] : I reviewed the above findings with the patient and her daughter with visual illustrations. Treatment options for the prolapse were discussed and included doing nothing, Kegel exercises and behavioral modification, a pessary, or surgical correction.  for the prolapse. She is interested in having a pessary placed over 10 for such. We discussed that her blood pressure today was elevated. She was placed. The new blood pressure medication by her internist. However, she has not started as of yet. We discussed following up with Dr. MCGUIRE for her elevated blood pressures. She will followup here for her prolapse. IUD, a patient information of pelvic organ prolapse and pessary was given to her. All questions were answered.

## 2021-02-15 LAB — BACTERIA UR CULT: NORMAL

## 2021-03-02 ENCOUNTER — APPOINTMENT (OUTPATIENT)
Dept: UROGYNECOLOGY | Facility: CLINIC | Age: 71
End: 2021-03-02
Payer: MEDICARE

## 2021-03-02 VITALS — DIASTOLIC BLOOD PRESSURE: 71 MMHG | SYSTOLIC BLOOD PRESSURE: 176 MMHG

## 2021-03-02 VITALS
TEMPERATURE: 97.5 F | DIASTOLIC BLOOD PRESSURE: 82 MMHG | HEIGHT: 64 IN | BODY MASS INDEX: 32.78 KG/M2 | WEIGHT: 192 LBS | SYSTOLIC BLOOD PRESSURE: 190 MMHG

## 2021-03-02 PROCEDURE — 99213 OFFICE O/P EST LOW 20 MIN: CPT

## 2021-03-02 PROCEDURE — 99072 ADDL SUPL MATRL&STAF TM PHE: CPT

## 2021-03-02 NOTE — DISCUSSION/SUMMARY
[FreeTextEntry1] : 1. POP\par - Patient fitted with RS#5, patient states she was able to urinate more effectively with the pessary in place. \par - Patient was able to perform self care for pessary by taking pessary out and putting the pessary back in place without any difficulties. \par - Advised patient to use water-based lubricants to put pessary back in place\par - Will RTO in 2 weeks for pessary check or sooner if needed\par \par 2. HTN\par - Repeat BP taken in office\par - Advised patient to continue follow up with PMD for elevated BP\par \par Patient agrees to call office with any questions or concerns, verbalized understanding.

## 2021-03-02 NOTE — HISTORY OF PRESENT ILLNESS
[FreeTextEntry1] : Patient presents to office today for initial pessary fitting.  Patient states she used to have a ring pessary 1 year ago but it fell out and was flushed down the toilet.  States she was doing her own self pessary maintenance.  States without the pessary she feels as though she does not empty her bladder well.  Denies any bowel complaints.  Denies any pelvic pain, pressure or vaginal bleeding. \par \par Patients Blood pressure was elevated upon arrival to apt.  Patient states she took her BP medication last night.  States she is following up with her PMD for her elevated BP. Denies headache, dizziness, nausea or vomiting.

## 2021-03-02 NOTE — PROCEDURE
[Good Fit] : fits well [Pessary Inserted] : inserted [None] : no bleeding [Medication Review] : Medicaiton Review: Patient verbalizes understanding of risks and benefits [Fluid Management] : Fluid Management: patient verbalizes understanding 6-10 cups per day [Bowel Management] : Bowel Management: patient verbalizes understanding of daily dietary fiber intake [Bladder Training] : Bladder Training: Patient given information with verbal understanding [FreeTextEntry1] : IPE [de-identified] : Fit with RS #5, pessary remained intact with valsalva, walking around and using bathroom. Patient reports comfortable fit.  [FreeTextEntry8] : Educated patient how to take pessary out and put pessary back in place, Patient verbalized understanding and returned demonstation. Edcuated for proper PeriCare.

## 2021-03-02 NOTE — PHYSICAL EXAM
[No Acute Distress] : in no acute distress [Well developed] : well developed [Well Nourished] : ~L well nourished [Good Hygeine] : demonstrates good hygeine [Oriented x3] : oriented to person, place, and time [Normal Memory] : ~T memory was ~L unimpaired [Normal Mood/Affect] : mood and affect are normal [Warm and Dry] : was warm and dry to touch [Normal Gait] : gait was normal [Labia Majora] : were normal [Labia Minora] : were normal [Normal Appearance] : general appearance was normal [Cystocele] : a cystocele [Uterine Prolapse] : uterine prolapse [No Bleeding] : there was no active vaginal bleeding [Normal] : normal [Anxiety] : patient is not anxious [Tenderness] : ~T no ~M abdominal tenderness observed [Distended] : not distended [FreeTextEntry4] : prolapse visible at vaginal introitus

## 2021-03-09 ENCOUNTER — NON-APPOINTMENT (OUTPATIENT)
Age: 71
End: 2021-03-09

## 2021-03-24 NOTE — ED ADULT TRIAGE NOTE - AS O2 DELIVERY
Hi Dr. Patrick Lucas please see request to speak with PCP below. Per records PCP is out of the office.   Thank you,  Hannah Hughes RN room air

## 2021-03-25 ENCOUNTER — APPOINTMENT (OUTPATIENT)
Dept: UROGYNECOLOGY | Facility: CLINIC | Age: 71
End: 2021-03-25

## 2021-03-27 PROBLEM — M54.16 LUMBAR RADICULOPATHY, CHRONIC: Status: ACTIVE | Noted: 2021-02-02

## 2021-03-30 ENCOUNTER — APPOINTMENT (OUTPATIENT)
Dept: INTERNAL MEDICINE | Facility: CLINIC | Age: 71
End: 2021-03-30

## 2021-03-30 DIAGNOSIS — M54.16 RADICULOPATHY, LUMBAR REGION: ICD-10-CM

## 2021-04-04 NOTE — HISTORY OF PRESENT ILLNESS
[FreeTextEntry1] : patient cancelled visit - will invalidate note [de-identified] : RE HTN: Last visit, reduced B-blocker due to bradycardia - with lower dose of metoprolol (100 mg --> 50 mg) BP was mildly elevated.  Therefore, changed HCTZ to chlorthalidone for better control.  \par Here for BP check on new medication and to check lytes/Cr. \par \par RE HLD: 10 yr risk ASCVD 18% - need to address statin\par \par RE HCM: works in [employer], needs COVID vaccination \par \par RE LBP: w/u in progress with Dr Rico

## 2021-04-29 ENCOUNTER — RX RENEWAL (OUTPATIENT)
Age: 71
End: 2021-04-29

## 2021-05-03 ENCOUNTER — NON-APPOINTMENT (OUTPATIENT)
Age: 71
End: 2021-05-03

## 2021-05-17 ENCOUNTER — OUTPATIENT (OUTPATIENT)
Dept: OUTPATIENT SERVICES | Facility: HOSPITAL | Age: 71
LOS: 1 days | End: 2021-05-17
Payer: MEDICARE

## 2021-05-17 ENCOUNTER — RESULT REVIEW (OUTPATIENT)
Age: 71
End: 2021-05-17

## 2021-05-17 ENCOUNTER — NON-APPOINTMENT (OUTPATIENT)
Age: 71
End: 2021-05-17

## 2021-05-17 ENCOUNTER — APPOINTMENT (OUTPATIENT)
Dept: MAMMOGRAPHY | Facility: IMAGING CENTER | Age: 71
End: 2021-05-17
Payer: MEDICARE

## 2021-05-17 ENCOUNTER — APPOINTMENT (OUTPATIENT)
Dept: MRI IMAGING | Facility: IMAGING CENTER | Age: 71
End: 2021-05-17

## 2021-05-17 ENCOUNTER — APPOINTMENT (OUTPATIENT)
Dept: ULTRASOUND IMAGING | Facility: IMAGING CENTER | Age: 71
End: 2021-05-17
Payer: MEDICARE

## 2021-05-17 DIAGNOSIS — R92.2 INCONCLUSIVE MAMMOGRAM: ICD-10-CM

## 2021-05-17 DIAGNOSIS — Z00.00 ENCOUNTER FOR GENERAL ADULT MEDICAL EXAMINATION WITHOUT ABNORMAL FINDINGS: ICD-10-CM

## 2021-05-17 PROCEDURE — 76641 ULTRASOUND BREAST COMPLETE: CPT

## 2021-05-17 PROCEDURE — 77063 BREAST TOMOSYNTHESIS BI: CPT

## 2021-05-17 PROCEDURE — 77067 SCR MAMMO BI INCL CAD: CPT | Mod: 26

## 2021-05-17 PROCEDURE — 77063 BREAST TOMOSYNTHESIS BI: CPT | Mod: 26

## 2021-05-17 PROCEDURE — 76641 ULTRASOUND BREAST COMPLETE: CPT | Mod: 26,50

## 2021-05-17 PROCEDURE — 77067 SCR MAMMO BI INCL CAD: CPT

## 2021-05-18 ENCOUNTER — APPOINTMENT (OUTPATIENT)
Dept: INTERNAL MEDICINE | Facility: CLINIC | Age: 71
End: 2021-05-18
Payer: MEDICARE

## 2021-05-18 ENCOUNTER — APPOINTMENT (OUTPATIENT)
Dept: UROGYNECOLOGY | Facility: CLINIC | Age: 71
End: 2021-05-18
Payer: MEDICARE

## 2021-05-18 VITALS
HEIGHT: 64 IN | OXYGEN SATURATION: 98 % | WEIGHT: 192 LBS | DIASTOLIC BLOOD PRESSURE: 70 MMHG | BODY MASS INDEX: 32.78 KG/M2 | SYSTOLIC BLOOD PRESSURE: 150 MMHG | HEART RATE: 59 BPM

## 2021-05-18 VITALS — SYSTOLIC BLOOD PRESSURE: 162 MMHG | DIASTOLIC BLOOD PRESSURE: 74 MMHG | HEART RATE: 65 BPM | TEMPERATURE: 95.9 F

## 2021-05-18 VITALS — DIASTOLIC BLOOD PRESSURE: 70 MMHG | SYSTOLIC BLOOD PRESSURE: 135 MMHG

## 2021-05-18 DIAGNOSIS — Z46.89 ENCOUNTER FOR FITTING AND ADJUSTMENT OF OTHER SPECIFIED DEVICES: ICD-10-CM

## 2021-05-18 PROCEDURE — 99213 OFFICE O/P EST LOW 20 MIN: CPT

## 2021-05-18 PROCEDURE — 99214 OFFICE O/P EST MOD 30 MIN: CPT

## 2021-05-20 NOTE — PHYSICAL EXAM
[No Acute Distress] : in no acute distress [Well developed] : well developed [Well Nourished] : ~L well nourished [Good Hygeine] : demonstrates good hygeine [Oriented x3] : oriented to person, place, and time [Normal Memory] : ~T memory was ~L unimpaired [Normal Mood/Affect] : mood and affect are normal [Warm and Dry] : was warm and dry to touch [Normal Gait] : gait was normal [Labia Majora] : were normal [Labia Minora] : were normal [Normal Appearance] : general appearance was normal [Cystocele] : a cystocele [Uterine Prolapse] : uterine prolapse [No Bleeding] : there was no active vaginal bleeding [Normal] : normal [Anxiety] : patient is not anxious [FreeTextEntry4] : prolapse visible at vaginal introitus

## 2021-05-20 NOTE — HISTORY OF PRESENT ILLNESS
[FreeTextEntry1] : 70 year old F who presents today for pessary maintenance.  The patient is feeling well.  She is emptying her bladder without difficulty and states she is no longer experiencing any urinary leakage.  No bowel habit complaints.  She continues to perform her own pessary maintenance.  She cleans the pessary every 4 days.  \par \par Of note, patient remains hypertensive, /74.  She is asymptomatic.  States she took her BP medication at 6 am.  She has an appointment with her PMD this morning and will review her antihypertensive medications at that time.

## 2021-05-20 NOTE — DISCUSSION/SUMMARY
[FreeTextEntry1] : # POP:\par - Continue with RS #5\par - Continue self care \par - Will RTO in 6 months or sooner if needed\par - She will contact the office with urinary changes, pelvic discomfort, vaginal bleeding or discharge. \par \par # HTN:\par - Patient is seeing PMD this morning and will discuss medication modification at that time\par \par Patient agrees to call office with any questions or concerns, verbalized understanding.

## 2021-05-20 NOTE — PROCEDURE
[Good Fit] : fits well [None] : no bleeding [Medication Review] : Medicaiton Review: Patient verbalizes understanding of risks and benefits [Fluid Management] : Fluid Management: patient verbalizes understanding 6-10 cups per day [Bowel Management] : Bowel Management: patient verbalizes understanding of daily dietary fiber intake [Pessary Washed] : washed [H2O] : H2O [Refit] : refit is not needed [Erosion] : no evidence of erosion [Erythema] : no erythema [Discharge] : no vaginal discharge [Infection] : no evidence of infection [FreeTextEntry1] : RS#5

## 2021-05-21 NOTE — ASSESSMENT
[FreeTextEntry1] : had bad rxn to gabapentin - felt so groggy in the morning when taking HS she could barely walk. \par Back pain - not well controlled. will reach out to Dr Rico RE updating rx for Mri\par discussed trial of acupuncture - she will consider\par f/u 3-4 months\par

## 2021-05-21 NOTE — HISTORY OF PRESENT ILLNESS
[FreeTextEntry1] : HTN with BP not in target range.\par Back pain not well controlled. [de-identified] : Last visit, after decreasing B-blocker due to bradycardia, BP was not at goal.  Changed diuretic from HCTZ to chlorthalidone. need recheck of BP. \par Also reports she went for MRI ordered by Dr Rico but the rx had  - back continues to bother her along with both LE. Working with Dr Rico. Tried using gabapentin - but says it was much too strong for her. Was given 300 mg to take HS.  The first time she used it, pain did not wake her up during the night but woke up so groggy and dizzy she couldn't function. Lasted most of the morning. \par

## 2021-05-21 NOTE — PHYSICAL EXAM
[No Acute Distress] : no acute distress [Well-Appearing] : well-appearing [No Lymphadenopathy] : no lymphadenopathy [No Respiratory Distress] : no respiratory distress  [Clear to Auscultation] : lungs were clear to auscultation bilaterally [Normal Rate] : normal rate  [Regular Rhythm] : with a regular rhythm [No Edema] : there was no peripheral edema [Non Tender] : non-tender [Normal Anterior Cervical Nodes] : no anterior cervical lymphadenopathy [No Spinal Tenderness] : no spinal tenderness [Grossly Normal Strength/Tone] : grossly normal strength/tone [No Rash] : no rash

## 2021-05-25 LAB
ANION GAP SERPL CALC-SCNC: 13 MMOL/L
BUN SERPL-MCNC: 16 MG/DL
CALCIUM SERPL-MCNC: 10.2 MG/DL
CHLORIDE SERPL-SCNC: 103 MMOL/L
CO2 SERPL-SCNC: 26 MMOL/L
CREAT SERPL-MCNC: 1.01 MG/DL
GLUCOSE SERPL-MCNC: 103 MG/DL
POTASSIUM SERPL-SCNC: 3.9 MMOL/L
SODIUM SERPL-SCNC: 142 MMOL/L

## 2021-07-27 ENCOUNTER — OUTPATIENT (OUTPATIENT)
Dept: OUTPATIENT SERVICES | Facility: HOSPITAL | Age: 71
LOS: 1 days | End: 2021-07-27
Payer: MEDICARE

## 2021-07-27 ENCOUNTER — RESULT REVIEW (OUTPATIENT)
Age: 71
End: 2021-07-27

## 2021-07-27 ENCOUNTER — APPOINTMENT (OUTPATIENT)
Dept: MRI IMAGING | Facility: IMAGING CENTER | Age: 71
End: 2021-07-27
Payer: MEDICARE

## 2021-07-27 DIAGNOSIS — M54.16 RADICULOPATHY, LUMBAR REGION: ICD-10-CM

## 2021-07-27 PROCEDURE — 72148 MRI LUMBAR SPINE W/O DYE: CPT | Mod: 26

## 2021-07-27 PROCEDURE — 72148 MRI LUMBAR SPINE W/O DYE: CPT

## 2021-08-06 ENCOUNTER — APPOINTMENT (OUTPATIENT)
Dept: PAIN MANAGEMENT | Facility: CLINIC | Age: 71
End: 2021-08-06

## 2021-08-18 ENCOUNTER — APPOINTMENT (OUTPATIENT)
Dept: INTERNAL MEDICINE | Facility: CLINIC | Age: 71
End: 2021-08-18
Payer: MEDICARE

## 2021-08-18 DIAGNOSIS — M54.9 DORSALGIA, UNSPECIFIED: ICD-10-CM

## 2021-08-18 DIAGNOSIS — R00.1 BRADYCARDIA, UNSPECIFIED: ICD-10-CM

## 2021-08-18 PROCEDURE — 99214 OFFICE O/P EST MOD 30 MIN: CPT

## 2021-08-18 NOTE — ASSESSMENT
[FreeTextEntry1] : Discussed new recommendations for booster for COVID 19 - advised getting booster as soon as available.

## 2021-08-18 NOTE — HISTORY OF PRESENT ILLNESS
[FreeTextEntry1] : BP not in target range [de-identified] : RE HTN: recently decreased metoprolol from 100 mg to 50 mg QD for bradycardia. Here to recheck BP to see if alternative or additional medication needed. \par 135/80\par RE back pain: still having severe lower back pain - having pain with nearly any activity where she has to stand for more then 5-10 min.  She finds when she bends over, it relieves the pain a bit so she does so often both and work and at home. \par Also experiencing pain at shoulder when she tries to raise hands over her head. \par She is concerned that she cant keep this up much longer - becoming more and more difficult to get through the day at work. \par

## 2021-08-18 NOTE — PHYSICAL EXAM
[No Acute Distress] : no acute distress [No Respiratory Distress] : no respiratory distress  [Clear to Auscultation] : lungs were clear to auscultation bilaterally [Regular Rhythm] : with a regular rhythm [Normal S1, S2] : normal S1 and S2

## 2021-08-31 ENCOUNTER — APPOINTMENT (OUTPATIENT)
Dept: INTERNAL MEDICINE | Facility: CLINIC | Age: 71
End: 2021-08-31

## 2021-09-14 ENCOUNTER — NON-APPOINTMENT (OUTPATIENT)
Age: 71
End: 2021-09-14

## 2021-09-17 ENCOUNTER — NON-APPOINTMENT (OUTPATIENT)
Age: 71
End: 2021-09-17

## 2021-11-14 ENCOUNTER — RESULT CHARGE (OUTPATIENT)
Age: 71
End: 2021-11-14

## 2021-11-16 ENCOUNTER — NON-APPOINTMENT (OUTPATIENT)
Age: 71
End: 2021-11-16

## 2021-11-16 ENCOUNTER — APPOINTMENT (OUTPATIENT)
Dept: INTERNAL MEDICINE | Facility: CLINIC | Age: 71
End: 2021-11-16
Payer: MEDICARE

## 2021-11-16 VITALS
SYSTOLIC BLOOD PRESSURE: 160 MMHG | BODY MASS INDEX: 32.1 KG/M2 | RESPIRATION RATE: 15 BRPM | WEIGHT: 188 LBS | HEIGHT: 64 IN | OXYGEN SATURATION: 98 % | HEART RATE: 60 BPM | DIASTOLIC BLOOD PRESSURE: 78 MMHG

## 2021-11-16 DIAGNOSIS — I10 ESSENTIAL (PRIMARY) HYPERTENSION: ICD-10-CM

## 2021-11-16 DIAGNOSIS — H53.9 UNSPECIFIED VISUAL DISTURBANCE: ICD-10-CM

## 2021-11-16 DIAGNOSIS — Z23 ENCOUNTER FOR IMMUNIZATION: ICD-10-CM

## 2021-11-16 PROCEDURE — G0439: CPT

## 2021-11-16 PROCEDURE — 90662 IIV NO PRSV INCREASED AG IM: CPT

## 2021-11-16 PROCEDURE — G0008: CPT

## 2021-11-21 LAB
ALBUMIN SERPL ELPH-MCNC: 4.2 G/DL
ALP BLD-CCNC: 67 U/L
ALT SERPL-CCNC: 14 U/L
ANION GAP SERPL CALC-SCNC: 13 MMOL/L
AST SERPL-CCNC: 18 U/L
BASOPHILS # BLD AUTO: 0.06 K/UL
BASOPHILS NFR BLD AUTO: 1 %
BILIRUB SERPL-MCNC: 0.4 MG/DL
BUN SERPL-MCNC: 17 MG/DL
CALCIUM SERPL-MCNC: 10.4 MG/DL
CHLORIDE SERPL-SCNC: 103 MMOL/L
CHOLEST SERPL-MCNC: 190 MG/DL
CO2 SERPL-SCNC: 27 MMOL/L
CREAT SERPL-MCNC: 1.16 MG/DL
EOSINOPHIL # BLD AUTO: 0.29 K/UL
EOSINOPHIL NFR BLD AUTO: 4.7 %
ESTIMATED AVERAGE GLUCOSE: 111 MG/DL
GLUCOSE SERPL-MCNC: 94 MG/DL
HBA1C MFR BLD HPLC: 5.5 %
HCT VFR BLD CALC: 37.8 %
HDLC SERPL-MCNC: 55 MG/DL
HGB BLD-MCNC: 12.3 G/DL
IMM GRANULOCYTES NFR BLD AUTO: 0.3 %
LDLC SERPL CALC-MCNC: 124 MG/DL
LYMPHOCYTES # BLD AUTO: 2.29 K/UL
LYMPHOCYTES NFR BLD AUTO: 37.1 %
MAN DIFF?: NORMAL
MCHC RBC-ENTMCNC: 29.8 PG
MCHC RBC-ENTMCNC: 32.5 GM/DL
MCV RBC AUTO: 91.5 FL
MONOCYTES # BLD AUTO: 0.6 K/UL
MONOCYTES NFR BLD AUTO: 9.7 %
NEUTROPHILS # BLD AUTO: 2.92 K/UL
NEUTROPHILS NFR BLD AUTO: 47.2 %
NONHDLC SERPL-MCNC: 135 MG/DL
PLATELET # BLD AUTO: 277 K/UL
POTASSIUM SERPL-SCNC: 4.7 MMOL/L
PROT SERPL-MCNC: 7.5 G/DL
RBC # BLD: 4.13 M/UL
RBC # FLD: 13.2 %
SODIUM SERPL-SCNC: 142 MMOL/L
TRIGL SERPL-MCNC: 58 MG/DL
TSH SERPL-ACNC: 1.05 UIU/ML
WBC # FLD AUTO: 6.18 K/UL

## 2021-11-21 NOTE — HISTORY OF PRESENT ILLNESS
[de-identified] : Was in ED at [facility] for sudden onset facial swelling on the left -\par Had Head CT x 2 - all ok by her report - they were worried about stroke but all tests neg.  \par  Dx with angioedema - given 3 days of diphenhydramine, prednisone and Pepcid - swelling was resolved by the next day.  \par \par RE shoulder pain: c/o decreased ROM and pain at left shoulder, chronic intermittent but mostly present\par RE back pain: LBP, hips down to legs.  Has impacted (reduced) her ability to work and quality of life. \par can only walk about 5 minutes\par Likely to retire in December due to these restriction.\par Was seeing urogyn RE pessary however now able to manage it herself using pessary - cleans herself Q 2 weeks\par

## 2021-11-21 NOTE — ASSESSMENT
[FreeTextEntry1] : RE pain: consider trying gabapentin again.  Dose of 300 mg was much too strong for her.  Consider trying 100 mg \par f/u 3 months

## 2021-11-21 NOTE — DISCUSSION/SUMMARY
[Subsequent Annual Wellness] : Subsequent Annual Wellness Visit [Preventive Exam & Counseling Completed] : with preventive exam as well as age and risk appropriate counseling completed [EKG] : EKG recommended [Healthy Diet] : counseling was given on maintaining a healthy diet [Healthy Weight] : counseling was given on maintaining a healthy weight [Blood Glucose] : due for blood glucose [Screening Current] : screening is current [Calcium and Vitamin D Intake] : counseling was given on obtaining adequate amounts of calcium and vitamin D on a daily basis [Regular Weightbearing Exercise] : counseling was given on the importance of regular weightbearing exercise [Screening Not Indicated] : screening not indicated [Ophthalmologist Referral] : ophthalmologist referral [Patient Declines] : the patient declines screening [Influenza Due Today] : influenza vaccine is due today [Influenza Recommended Annually] : influenza vaccination is recommended annually [Lifetime Vaccine Completed] : the lifetime pneumococcal vaccine has been completed [Complete and Up to Date] : complete and up to date [Encouraged to F/U to Discuss Questions/Decisions] : ~he/she~ was encouraged to follow-up with me to discuss ~his/her~ questions and/or decisions [DXA Axial] : due for DXA axial skeleton

## 2021-11-21 NOTE — PHYSICAL EXAM
[No Acute Distress] : no acute distress [Well-Appearing] : well-appearing [Normal Sclera/Conjunctiva] : normal sclera/conjunctiva [EOMI] : extraocular movements intact [No Respiratory Distress] : no respiratory distress  [Clear to Auscultation] : lungs were clear to auscultation bilaterally [Normal Rate] : normal rate  [Regular Rhythm] : with a regular rhythm [Normal S1, S2] : normal S1 and S2 [Normal Appearance] : normal in appearance [No Masses] : no palpable masses [No Nipple Discharge] : no nipple discharge [No Axillary Lymphadenopathy] : no axillary lymphadenopathy [Soft] : abdomen soft [Non Tender] : non-tender [Normal Axillary Nodes] : no axillary lymphadenopathy [Normal Anterior Cervical Nodes] : no anterior cervical lymphadenopathy [No CVA Tenderness] : no CVA  tenderness [No Focal Deficits] : no focal deficits [Normal Affect] : the affect was normal [Normal Insight/Judgement] : insight and judgment were intact

## 2021-11-21 NOTE — HEALTH RISK ASSESSMENT
[Patient reported mammogram was normal] : Patient reported mammogram was normal [Patient reported PAP Smear was abnormal] : Patient reported PAP Smear was abnormal [Patient reported colonoscopy was normal] : Patient reported colonoscopy was normal [Never (0 pts)] : Never (0 points) [No falls in past year] : Patient reported no falls in the past year [0] : 2) Feeling down, depressed, or hopeless: Not at all (0) [PHQ-2 Negative - No further assessment needed] : PHQ-2 Negative - No further assessment needed [HIV test declined] : HIV test declined [Hepatitis C test declined] : Hepatitis C test declined [None] : None [With Family] : lives with family [Employed] : employed [Single] : single [Feels Safe at Home] : Feels safe at home [Fully functional (bathing, dressing, toileting, transferring, walking, feeding)] : Fully functional (bathing, dressing, toileting, transferring, walking, feeding) [Fully functional (using the telephone, shopping, preparing meals, housekeeping, doing laundry, using] : Fully functional and needs no help or supervision to perform IADLs (using the telephone, shopping, preparing meals, housekeeping, doing laundry, using transportation, managing medications and managing finances) [Smoke Detector] : smoke detector [Carbon Monoxide Detector] : carbon monoxide detector [Seat Belt] :  uses seat belt [Reviewed no changes] : Reviewed, no changes [] : No [No] : No [de-identified] : see HPI -  [de-identified] : varied. avoids all carbs. reduced salt, no added sweets [EyeExamDate] : 10/21 [Change in mental status noted] : No change in mental status noted [Sexually Active] : not sexually active [Reports changes in hearing] : Reports no changes in hearing [MammogramDate] : 05/21 [PapSmearDate] : 10/20 [PapSmearComments] : due Jan 2022 as per gyn note [ColonoscopyDate] : 03/13 [de-identified] : needs glasses at all time - has appt pendinng for ophtho [AdvancecareDate] : 11/21

## 2021-11-24 ENCOUNTER — NON-APPOINTMENT (OUTPATIENT)
Age: 71
End: 2021-11-24

## 2022-05-10 ENCOUNTER — APPOINTMENT (OUTPATIENT)
Dept: INTERNAL MEDICINE | Facility: CLINIC | Age: 72
End: 2022-05-10

## 2022-05-24 ENCOUNTER — APPOINTMENT (OUTPATIENT)
Dept: INTERNAL MEDICINE | Facility: CLINIC | Age: 72
End: 2022-05-24

## 2022-06-28 PROBLEM — J45.909 ASTHMA, CHRONIC: Status: ACTIVE | Noted: 2020-04-23

## 2022-06-29 ENCOUNTER — APPOINTMENT (OUTPATIENT)
Dept: INTERNAL MEDICINE | Facility: CLINIC | Age: 72
End: 2022-06-29
Payer: MEDICARE

## 2022-06-29 VITALS — SYSTOLIC BLOOD PRESSURE: 138 MMHG | DIASTOLIC BLOOD PRESSURE: 78 MMHG

## 2022-06-29 VITALS
WEIGHT: 194 LBS | HEART RATE: 65 BPM | HEIGHT: 64 IN | BODY MASS INDEX: 33.12 KG/M2 | DIASTOLIC BLOOD PRESSURE: 80 MMHG | SYSTOLIC BLOOD PRESSURE: 160 MMHG | OXYGEN SATURATION: 98 %

## 2022-06-29 DIAGNOSIS — M25.561 PAIN IN RIGHT KNEE: ICD-10-CM

## 2022-06-29 DIAGNOSIS — J45.909 UNSPECIFIED ASTHMA, UNCOMPLICATED: ICD-10-CM

## 2022-06-29 DIAGNOSIS — M25.562 PAIN IN RIGHT KNEE: ICD-10-CM

## 2022-06-29 DIAGNOSIS — M25.519 PAIN IN UNSPECIFIED SHOULDER: ICD-10-CM

## 2022-06-29 PROCEDURE — 99214 OFFICE O/P EST MOD 30 MIN: CPT

## 2022-06-29 RX ORDER — CHLORTHALIDONE 25 MG/1
25 TABLET ORAL
Qty: 90 | Refills: 3 | Status: ACTIVE | COMMUNITY
Start: 2021-01-26 | End: 1900-01-01

## 2022-07-02 NOTE — HISTORY OF PRESENT ILLNESS
[FreeTextEntry1] : HTN, obesity [de-identified] : 72 yo F with h/o HTN, asthma, obesity, chronic back and shoulder pain\par \par RE angioedema: Seen in [facility] in November for face swelling -  dx with angioedema - treated with diphenhydramine, prednisone and Pepcid - swelling was resolved by the next day.  etiology not determined.  Does not report any recurrence in sx today. \par Chronic pain remains a significant issue for her. \par RE shoulder pain: c/o decreased ROM and pain at left shoulder, chronic intermittent but mostly present\par RE back pain: LBP, hips down to legs.  Has impacted (reduced) her ability to work and quality of life. \par can only walk about 5 minutes.  Likely to retire in December due to these restrictions and she is finding it increasingly difficult to work. .\par Last visit referred to [facility] PT for shoulder and back - she tried to make appt but was told that they do not accept her insurance. \par Was seeing urogyn RE pessary however now able to manage it herself using pessary - cleans herself Q 2 weeks\par

## 2022-07-02 NOTE — ASSESSMENT
[FreeTextEntry1] : 72 yo F with h/o HTN, asthma, obesity, chronic back and shoulder pain\par \par Chronic pain remains a significant issue for her. \par RE shoulder pain: c/o decreased ROM and pain at left shoulder, chronic intermittent but mostly present\par RE back pain: LBP, hips down to legs.  Has impacted (reduced) her ability to work and quality of life. \par can only walk about 5 minutes.  Likely to retire in December due to these restrictions and she is finding it increasingly difficult to work. Also difficult to sleep - she has tried tylenol PM but doesn't really help.\par She had been prescribed gabapentin by neurology but found the 300 mg HS dose much too strong. \par Discussed risk v benefit and a trial of gabapentin 100 mg HS - she is willing to give it a try.\par Last visit referred to [facility] PT for shoulder and back - she tried to make appt but was told that they do not accept her insurance. \par Will also reach out to  to see if there are any services we can provide based on her insurance as this has proven to be a significant barrier to care. .  \par RE HTN: will renew current meds. If BP drifts any higher, consider change from metoprolol to carvedilol. \par

## 2022-07-02 NOTE — PHYSICAL EXAM
[No Acute Distress] : no acute distress [Well-Appearing] : well-appearing [Normal Sclera/Conjunctiva] : normal sclera/conjunctiva [EOMI] : extraocular movements intact [No Lymphadenopathy] : no lymphadenopathy [No Respiratory Distress] : no respiratory distress  [Clear to Auscultation] : lungs were clear to auscultation bilaterally [Normal Rate] : normal rate  [Regular Rhythm] : with a regular rhythm [Normal S1, S2] : normal S1 and S2 [Soft] : abdomen soft [Non Tender] : non-tender [No CVA Tenderness] : no CVA  tenderness [Grossly Normal Strength/Tone] : grossly normal strength/tone [No Rash] : no rash [No Focal Deficits] : no focal deficits [Normal Affect] : the affect was normal [Normal Insight/Judgement] : insight and judgment were intact

## 2022-07-04 LAB
ALBUMIN SERPL ELPH-MCNC: 4.4 G/DL
ALP BLD-CCNC: 69 U/L
ALT SERPL-CCNC: 17 U/L
ANION GAP SERPL CALC-SCNC: 13 MMOL/L
AST SERPL-CCNC: 20 U/L
BASOPHILS # BLD AUTO: 0.05 K/UL
BASOPHILS NFR BLD AUTO: 0.6 %
BILIRUB SERPL-MCNC: 0.4 MG/DL
BUN SERPL-MCNC: 19 MG/DL
CALCIUM SERPL-MCNC: 10.5 MG/DL
CHLORIDE SERPL-SCNC: 104 MMOL/L
CO2 SERPL-SCNC: 26 MMOL/L
CREAT SERPL-MCNC: 1.07 MG/DL
EGFR: 56 ML/MIN/1.73M2
EOSINOPHIL # BLD AUTO: 0.38 K/UL
EOSINOPHIL NFR BLD AUTO: 4.9 %
GLUCOSE SERPL-MCNC: 101 MG/DL
HCT VFR BLD CALC: 37.3 %
HGB BLD-MCNC: 12 G/DL
IMM GRANULOCYTES NFR BLD AUTO: 0.4 %
LYMPHOCYTES # BLD AUTO: 2.53 K/UL
LYMPHOCYTES NFR BLD AUTO: 32.6 %
MAN DIFF?: NORMAL
MCHC RBC-ENTMCNC: 28.6 PG
MCHC RBC-ENTMCNC: 32.2 GM/DL
MCV RBC AUTO: 89 FL
MONOCYTES # BLD AUTO: 0.62 K/UL
MONOCYTES NFR BLD AUTO: 8 %
NEUTROPHILS # BLD AUTO: 4.16 K/UL
NEUTROPHILS NFR BLD AUTO: 53.5 %
PLATELET # BLD AUTO: 274 K/UL
POTASSIUM SERPL-SCNC: 4.1 MMOL/L
PROT SERPL-MCNC: 8 G/DL
RBC # BLD: 4.19 M/UL
RBC # FLD: 14.2 %
SODIUM SERPL-SCNC: 143 MMOL/L
WBC # FLD AUTO: 7.77 K/UL

## 2022-08-03 ENCOUNTER — APPOINTMENT (OUTPATIENT)
Dept: PHYSICAL MEDICINE AND REHAB | Facility: CLINIC | Age: 72
End: 2022-08-03
Payer: MEDICARE

## 2022-08-03 VITALS
DIASTOLIC BLOOD PRESSURE: 75 MMHG | TEMPERATURE: 97.2 F | OXYGEN SATURATION: 100 % | HEART RATE: 70 BPM | SYSTOLIC BLOOD PRESSURE: 157 MMHG

## 2022-08-03 DIAGNOSIS — M72.2 PLANTAR FASCIAL FIBROMATOSIS: ICD-10-CM

## 2022-08-03 DIAGNOSIS — M25.511 PAIN IN RIGHT SHOULDER: ICD-10-CM

## 2022-08-03 DIAGNOSIS — M47.817 SPONDYLOSIS W/OUT MYELOPATHY OR RADICULOPATHY, LUMBOSACRAL REGION: ICD-10-CM

## 2022-08-03 PROCEDURE — 20610 DRAIN/INJ JOINT/BURSA W/O US: CPT | Mod: RT

## 2022-08-03 PROCEDURE — 99204 OFFICE O/P NEW MOD 45 MIN: CPT | Mod: 25

## 2022-08-03 NOTE — HISTORY OF PRESENT ILLNESS
[FreeTextEntry1] : Patient is a 71 year old woman who presents with her daughter for evaluation. She complains of right shoulder pain, lower back and left leg pain, b/l pain in feet. Her shoulder pain started many years ago, feels a burning sensation in left arm, pain at night, denies weakness, but feels limited in her activities, works as a . She received a cortisone injection to right shoulder in 2015, has recently started OT at home. Her lower back pain is worse with standing, left anterior thigh pain, no weakness in lower extremities, was prescribed Gabapentin, made her sleepy, restarted recently at lower dose. Bilateral foot pain, worse when stands up in the morning, goes away with walking. Used to take Aleve as needed for pain. She is cutting down on her days at work, now working two days a week.

## 2022-08-03 NOTE — PHYSICAL EXAM
[Normal] : Oriented to person, place, and time, insight and judgement were intact and the affect was normal [de-identified] : no pain with cervical spine ROM  [de-identified] : no respiratory distress  [de-identified] : warm, well perfused  [de-identified] : no pain with lumbar flexion, pain with extension, no spinal tenderness, +left lumbar paraspinal trigger points  [de-identified] : strength 5/5, right shoulder ROM, pain with abduction, internal/external rotation, negative SLR, gait normal, +medial calcaneal tenderness

## 2022-08-03 NOTE — ASSESSMENT
[FreeTextEntry1] : 71 year old woman with right shoulder, lower back pain, plantar fascitis\par cortisone injection to right shoulder today\par continue OT at home, ROM, stretching, HEP\par no medications were prescribed, Aleve/tylenol as needed for pain, continue gabapentin\par recommend PT for lower back\par daily stretching at home, with heat/ice\par follow up in one month

## 2022-08-03 NOTE — PROCEDURE
[de-identified] : Injection: right shoulder\par \par A discussion was had with the patient regarding this procedure and all questions were answered. All risks, benefits, and alternatives were discussed. These included but were not limited to bleeding, infection, and allergic reaction. Betadine to sterilize and prep the area in the posterior right shoulder. Alcohol was used to clean the skin. A 25 gauge needle was used to inject 2 cc of 1% lidocaine and 1cc of 40 mg/ml methylprednisolone into the right shoulder. A bandage was applied, the patient tolerated the procedure well.\par

## 2022-08-04 ENCOUNTER — NON-APPOINTMENT (OUTPATIENT)
Age: 72
End: 2022-08-04

## 2022-11-15 ENCOUNTER — APPOINTMENT (OUTPATIENT)
Dept: INTERNAL MEDICINE | Facility: CLINIC | Age: 72
End: 2022-11-15
Payer: MEDICARE

## 2022-11-30 ENCOUNTER — APPOINTMENT (OUTPATIENT)
Dept: OBGYN | Facility: CLINIC | Age: 72
End: 2022-11-30

## 2022-12-29 PROBLEM — R92.2 DENSE BREAST: Status: ACTIVE | Noted: 2020-10-13

## 2022-12-29 PROBLEM — Z13.820 SCREENING FOR OSTEOPOROSIS: Status: ACTIVE | Noted: 2021-11-15

## 2022-12-29 PROBLEM — Z12.4 SCREENING FOR CERVICAL CANCER: Status: ACTIVE | Noted: 2022-12-29

## 2022-12-29 NOTE — HISTORY OF PRESENT ILLNESS
[FreeTextEntry1] : no show for appt - will invalidate note [de-identified] : 70 yo F with h/o HTN, asthma, obesity, chronic back and shoulder pain, asthma\par \par Chart reviewed today in preparation for visit. \par Since last visit, has been seen once by [facility] - had injection into shoulder and referred to PT for LBP\par \par Prior (6/22)\par Chronic pain remains a significant issue for her. \par RE shoulder pain: c/o decreased ROM and pain at left shoulder, chronic intermittent but mostly present\par RE back pain: LBP, hips down to legs.  Has impacted (reduced) her ability to work and quality of life. \par can only walk about 5 minutes.  Likely to retire in December due to these restrictions and she is finding it increasingly difficult to work. Also difficult to sleep - she has tried tylenol PM but doesn't really help.\par She had been prescribed gabapentin by neurology but found the 300 mg HS dose much too strong. \par Discussed risk v benefit and a trial of gabapentin 100 mg HS - she is willing to give it a try.\par Last visit referred to [facility] PT for shoulder and back - she tried to make appt but was told that they do not accept her insurance. \par Will also reach out to  to see if there are any services we can provide based on her insurance as this has proven to be a significant barrier to care. .  \par RE HTN: will renew current meds. If BP drifts any higher, consider change from metoprolol to carvedilol. \par

## 2022-12-30 ENCOUNTER — NON-APPOINTMENT (OUTPATIENT)
Age: 72
End: 2022-12-30

## 2022-12-30 ENCOUNTER — APPOINTMENT (OUTPATIENT)
Dept: INTERNAL MEDICINE | Facility: CLINIC | Age: 72
End: 2022-12-30
Payer: MEDICARE

## 2022-12-30 VITALS
BODY MASS INDEX: 32.95 KG/M2 | WEIGHT: 193 LBS | HEART RATE: 64 BPM | DIASTOLIC BLOOD PRESSURE: 84 MMHG | OXYGEN SATURATION: 98 % | HEIGHT: 64 IN | SYSTOLIC BLOOD PRESSURE: 148 MMHG

## 2022-12-30 VITALS — SYSTOLIC BLOOD PRESSURE: 135 MMHG | DIASTOLIC BLOOD PRESSURE: 80 MMHG

## 2022-12-30 DIAGNOSIS — Z00.00 ENCOUNTER FOR GENERAL ADULT MEDICAL EXAMINATION W/OUT ABNORMAL FINDINGS: ICD-10-CM

## 2022-12-30 DIAGNOSIS — I10 ESSENTIAL (PRIMARY) HYPERTENSION: ICD-10-CM

## 2022-12-30 DIAGNOSIS — M25.50 PAIN IN UNSPECIFIED JOINT: ICD-10-CM

## 2022-12-30 DIAGNOSIS — E83.52 HYPERCALCEMIA: ICD-10-CM

## 2022-12-30 DIAGNOSIS — Z12.4 ENCOUNTER FOR SCREENING FOR MALIGNANT NEOPLASM OF CERVIX: ICD-10-CM

## 2022-12-30 DIAGNOSIS — R92.2 INCONCLUSIVE MAMMOGRAM: ICD-10-CM

## 2022-12-30 DIAGNOSIS — R73.03 PREDIABETES.: ICD-10-CM

## 2022-12-30 DIAGNOSIS — E66.9 OBESITY, UNSPECIFIED: ICD-10-CM

## 2022-12-30 DIAGNOSIS — Z13.820 ENCOUNTER FOR SCREENING FOR OSTEOPOROSIS: ICD-10-CM

## 2022-12-30 PROCEDURE — G0008: CPT

## 2022-12-30 PROCEDURE — 90662 IIV NO PRSV INCREASED AG IM: CPT

## 2022-12-30 PROCEDURE — G0444 DEPRESSION SCREEN ANNUAL: CPT

## 2022-12-30 PROCEDURE — G0439: CPT

## 2023-01-08 PROBLEM — M25.50 PAIN IN JOINT, MULTIPLE SITES: Status: ACTIVE | Noted: 2023-01-08

## 2023-01-08 PROBLEM — E83.52 HYPERCALCEMIA: Status: ACTIVE | Noted: 2023-01-08

## 2023-01-08 LAB
ALBUMIN SERPL ELPH-MCNC: 4.4 G/DL
ALP BLD-CCNC: 70 U/L
ALT SERPL-CCNC: 15 U/L
ANION GAP SERPL CALC-SCNC: 11 MMOL/L
AST SERPL-CCNC: 17 U/L
BASOPHILS # BLD AUTO: 0.05 K/UL
BASOPHILS NFR BLD AUTO: 0.7 %
BILIRUB SERPL-MCNC: 0.3 MG/DL
BUN SERPL-MCNC: 19 MG/DL
CALCIUM SERPL-MCNC: 10.9 MG/DL
CHLORIDE SERPL-SCNC: 102 MMOL/L
CHOLEST SERPL-MCNC: 210 MG/DL
CO2 SERPL-SCNC: 29 MMOL/L
CREAT SERPL-MCNC: 1.09 MG/DL
EGFR: 55 ML/MIN/1.73M2
EOSINOPHIL # BLD AUTO: 0.28 K/UL
EOSINOPHIL NFR BLD AUTO: 3.8 %
ESTIMATED AVERAGE GLUCOSE: 114 MG/DL
GLUCOSE SERPL-MCNC: 94 MG/DL
HBA1C MFR BLD HPLC: 5.6 %
HCT VFR BLD CALC: 37.1 %
HDLC SERPL-MCNC: 59 MG/DL
HGB BLD-MCNC: 12.2 G/DL
IMM GRANULOCYTES NFR BLD AUTO: 0.5 %
LDLC SERPL CALC-MCNC: 137 MG/DL
LYMPHOCYTES # BLD AUTO: 2.57 K/UL
LYMPHOCYTES NFR BLD AUTO: 34.5 %
MAN DIFF?: NORMAL
MCHC RBC-ENTMCNC: 29.7 PG
MCHC RBC-ENTMCNC: 32.9 GM/DL
MCV RBC AUTO: 90.3 FL
MONOCYTES # BLD AUTO: 0.6 K/UL
MONOCYTES NFR BLD AUTO: 8.1 %
NEUTROPHILS # BLD AUTO: 3.91 K/UL
NEUTROPHILS NFR BLD AUTO: 52.4 %
NONHDLC SERPL-MCNC: 150 MG/DL
PLATELET # BLD AUTO: 300 K/UL
POTASSIUM SERPL-SCNC: 4.1 MMOL/L
PROT SERPL-MCNC: 7.9 G/DL
RBC # BLD: 4.11 M/UL
RBC # FLD: 13.4 %
SODIUM SERPL-SCNC: 142 MMOL/L
TRIGL SERPL-MCNC: 65 MG/DL
WBC # FLD AUTO: 7.45 K/UL

## 2023-01-08 NOTE — PHYSICAL EXAM
[No Acute Distress] : no acute distress [Well-Appearing] : well-appearing [Normal Sclera/Conjunctiva] : normal sclera/conjunctiva [EOMI] : extraocular movements intact [No Lymphadenopathy] : no lymphadenopathy [Supple] : supple [No Respiratory Distress] : no respiratory distress  [Clear to Auscultation] : lungs were clear to auscultation bilaterally [Normal Rate] : normal rate  [Regular Rhythm] : with a regular rhythm [Normal S1, S2] : normal S1 and S2 [Pedal Pulses Present] : the pedal pulses are present [No Edema] : there was no peripheral edema [Normal Appearance] : normal in appearance [No Masses] : no palpable masses [No Axillary Lymphadenopathy] : no axillary lymphadenopathy [Soft] : abdomen soft [Non Tender] : non-tender [Normal Bowel Sounds] : normal bowel sounds [Normal Axillary Nodes] : no axillary lymphadenopathy [Normal Anterior Cervical Nodes] : no anterior cervical lymphadenopathy [No CVA Tenderness] : no CVA  tenderness [No Joint Swelling] : no joint swelling [Grossly Normal Strength/Tone] : grossly normal strength/tone [No Rash] : no rash [No Focal Deficits] : no focal deficits [Deep Tendon Reflexes (DTR)] : deep tendon reflexes were 2+ and symmetric [Normal Affect] : the affect was normal [Normal Insight/Judgement] : insight and judgment were intact [PERRL] : pupils equal round and reactive to light [de-identified] : Left eye pterygium  [de-identified] : No skin changes

## 2023-01-08 NOTE — DISCUSSION/SUMMARY
[Subsequent Annual Wellness] : Subsequent Annual Wellness Visit [Preventive Exam & Counseling Completed] : with preventive exam as well as age and risk appropriate counseling completed [EKG] : EKG recommended [Lipids Panel] : due for a lipid panel [Healthy Weight] : counseling was given on maintaining a healthy weight [Blood Glucose] : due for blood glucose [Screening Mammogram] : due for a screening mammogram [Cervical Pap Smear] : due for a cervical pap smear [Calcium and Vitamin D Intake] : counseling was given on obtaining adequate amounts of calcium and vitamin D on a daily basis [DXA Axial] : due for DXA axial skeleton [Screening Not Indicated] : screening not indicated [Screening Current] : screening is current [Patient Declines] : the patient declines screening [Influenza Due Today] : influenza vaccine is due today [Influenza Recommended Annually] : influenza vaccination is recommended annually [Vaccine Status Unknown] : pneumococcal vaccination status is unknown [Complete and Up to Date] : complete and up to date [Follow-Up in ___] : follow-up visit needed in [unfilled]

## 2023-01-08 NOTE — ASSESSMENT
[FreeTextEntry1] : 72 yo F with h/o HTN, asthma, obesity, chronic back and shoulder pain, asthma\par \par RE Joint pain, multiple sites. Using topical Careall muscle and joint - menthol \par Ultra pain-a-trate: camphor, menthol and methyl salicylate. \par they work better then the pills - so she stopped all the pills and is using these topical meds. \par sent in rx for the above - \par Has really been usable to work for months but pushing herself and by the time she gets home, can hardly move. Gave her contact info for practice SW - see if we can assist with disability \par RE HTN: in target range on current meds. follow low Na diet. wt loss will also be helpful. \par RE asthma: quiescent currently - continue same meds. \par RE obesity: discussed diet and exercise. \par RE HLD: likely will need statin - will base on labs to be done today\par \par needs help for disability - \par \par Annual alcohol screen completed this visit. Alcohol use within healthy limits.  Healthy drinking guidelines shared with patient (Male no more than 4 SSD on any day, no more than 14 SSD per week; Female and male overage 65 no more than 3 SSD on any day, no more than 7 per week). Positive reinforcement provided given patient currently within healthy guidelines. \par \par Annual depression screen completed this visit and reviewed.  Screening not suggestive of diagnosis of MDD.

## 2023-01-08 NOTE — HISTORY OF PRESENT ILLNESS
[de-identified] : 70 yo F with h/o HTN, asthma, obesity, chronic back and shoulder pain, asthma\par \par Chart reviewed today in preparation for visit. \par Since last visit, has been seen once by [facility] - had injection into shoulder and referred to PT for LBP\par \par Using topical Careall muscle and joint - menthol \par Ultra pain-a-trate: camphor, menthol and methyl salicylate. \par they work better then the pills - so she stopped all the pills and is using these topical meds. \par needs help for disability - \par \par Prior (6/22)\par Chronic pain remains a significant issue for her. \par RE shoulder pain: c/o decreased ROM and pain at left shoulder, chronic intermittent but mostly present\par RE back pain: LBP, hips down to legs.  Has impacted (reduced) her ability to work and quality of life. \par can only walk about 5 minutes.  Likely to retire in December due to these restrictions and she is finding it increasingly difficult to work. Also difficult to sleep - she has tried tylenol PM but doesn't really help.\par She had been prescribed gabapentin by neurology but found the 300 mg HS dose much too strong. \par Discussed risk v benefit and a trial of gabapentin 100 mg HS - she is willing to give it a try.\par Last visit referred to [facility] and home PT for shoulder and back - she tried to make appt but was told that they do not accept her insurance. \par Will also reach out to  to see if there are any services we can provide based on her insurance as this has proven to be a significant barrier to care. .  \par RE HTN: will renew current meds. If BP drifts any higher, consider change from metoprolol to carvedilol. \par

## 2023-01-08 NOTE — HEALTH RISK ASSESSMENT
[Patient reported PAP Smear was abnormal] : Patient reported PAP Smear was abnormal [Patient reported colonoscopy was normal] : Patient reported colonoscopy was normal [Never] : Never [Yes] : Yes [No falls in past year] : Patient reported no falls in the past year [No Retinopathy] : No retinopathy [Patient declined bone density test] : Patient declined bone density test [HIV test declined] : HIV test declined [Hepatitis C test declined] : Hepatitis C test declined [With Family] : lives with family [Employed] : employed [Single] : single [Feels Safe at Home] : Feels safe at home [Fully functional (bathing, dressing, toileting, transferring, walking, feeding)] : Fully functional (bathing, dressing, toileting, transferring, walking, feeding) [Fully functional (using the telephone, shopping, preparing meals, housekeeping, doing laundry, using] : Fully functional and needs no help or supervision to perform IADLs (using the telephone, shopping, preparing meals, housekeeping, doing laundry, using transportation, managing medications and managing finances) [Smoke Detector] : smoke detector [Carbon Monoxide Detector] : carbon monoxide detector [Seat Belt] :  uses seat belt [Name: ___] : Health Care Proxy's Name: [unfilled]  [Relationship: ___] : Relationship: [unfilled] [0] : 2) Feeling down, depressed, or hopeless: Not at all (0) [PHQ-2 Negative - No further assessment needed] : PHQ-2 Negative - No further assessment needed [de-identified] : working 2 times a week - trying to stick with it - but very [de-identified] : regular varied [THL2Ligsy] : 0 [EyeExamDate] : 01/22 [Change in mental status noted] : No change in mental status noted [Sexually Active] : not sexually active [Reports changes in hearing] : Reports no changes in hearing [Reports changes in vision] : Reports no changes in vision [MammogramDate] : 05/21 [PapSmearDate] : 11/20 [PapSmearComments] : ASCUS - needs to be repeated [ColonoscopyDate] : 03/13 [ColonoscopyComments] : next due 03/23 [de-identified] : severe arthritis [de-identified] : 2 days a week [AdvancecareDate] : 12/22

## 2023-01-09 ENCOUNTER — NON-APPOINTMENT (OUTPATIENT)
Age: 73
End: 2023-01-09

## 2023-03-06 ENCOUNTER — NON-APPOINTMENT (OUTPATIENT)
Age: 73
End: 2023-03-06

## 2023-03-11 NOTE — HEALTH RISK ASSESSMENT
Call pt:  Comprehensive vaginosis panel is NORMAL.  [No] : No [No falls in past year] : Patient reported no falls in the past year [0] : 2) Feeling down, depressed, or hopeless: Not at all (0) [No Retinopathy] : No retinopathy [HIV Test offered] : HIV Test offered [Hepatitis C test offered] : Hepatitis C test offered [None] : None [With Family] : lives with family [] :  [Feels Safe at Home] : Feels safe at home [Smoke Detector] : smoke detector [Carbon Monoxide Detector] : carbon monoxide detector [Seat Belt] :  uses seat belt [Patient/Caregiver not ready to engage] : Patient/Caregiver not ready to engage [Designated Healthcare Proxy] : Designated healthcare proxy [Name: ___] : Health Care Proxy's Name: [unfilled]  [Relationship: ___] : Relationship: [unfilled] [Never (0 pts)] : Never (0 points) [] : No [Audit-CScore] : 0 [de-identified] : very active st work [de-identified] : low carb - no red meat [LFE8Ncsfc] : 0 [EyeExamDate] : 10/20 [Change in mental status noted] : No change in mental status noted [Sexually Active] : not sexually active [MammogramComments] : due [ColonoscopyDate] : 03/13 [AdvancecareDate] : 11/20

## 2023-03-14 ENCOUNTER — APPOINTMENT (OUTPATIENT)
Dept: OBGYN | Facility: CLINIC | Age: 73
End: 2023-03-14
Payer: MEDICARE

## 2023-03-14 ENCOUNTER — LABORATORY RESULT (OUTPATIENT)
Age: 73
End: 2023-03-14

## 2023-03-14 VITALS
DIASTOLIC BLOOD PRESSURE: 76 MMHG | HEIGHT: 64 IN | SYSTOLIC BLOOD PRESSURE: 143 MMHG | BODY MASS INDEX: 32.78 KG/M2 | WEIGHT: 192 LBS

## 2023-03-14 DIAGNOSIS — Z01.419 ENCOUNTER FOR GYNECOLOGICAL EXAMINATION (GENERAL) (ROUTINE) W/OUT ABNORMAL FINDINGS: ICD-10-CM

## 2023-03-14 PROCEDURE — G0101: CPT

## 2023-03-14 RX ORDER — GABAPENTIN 100 MG/1
100 CAPSULE ORAL
Qty: 90 | Refills: 2 | Status: DISCONTINUED | COMMUNITY
Start: 2021-02-02 | End: 2023-03-14

## 2023-03-14 NOTE — HISTORY OF PRESENT ILLNESS
[Patient reported mammogram was normal] : Patient reported mammogram was normal [Patient reported PAP Smear was abnormal] : Patient reported PAP Smear was abnormal [Patient reported bone density results were normal] : Patient reported bone density results were normal [Patient reported colonoscopy was normal] : Patient reported colonoscopy was normal [Post-Menopause, No Sxs] : post-menopausal, currently without symptoms [Menopause Age: ____] : age at menopause was [unfilled] [Previously active] : previously active [FreeTextEntry1] : 73 y/o  postmenopausal F presents for annual\par \par +back pain\par \par PGYN: \par PAP ASCUS, + HPV 18 (), s/p colposcopy \par \par COVID vaccinated \par Works as a   [Mammogramdate] : 2021 [TextBox_19] : has appt scheduled for 3/25 [PapSmeardate] : 2020 [BoneDensityDate] : 2021 [TextBox_31] : ASCUS, + HPV 18 [ColonoscopyDate] : 2013 [TextBox_43] : has appt  [FreeTextEntry2] : years ago

## 2023-03-14 NOTE — PHYSICAL EXAM
[Appropriately responsive] : appropriately responsive [Alert] : alert [No Acute Distress] : no acute distress [Soft] : soft [Non-tender] : non-tender [Non-distended] : non-distended [Oriented x3] : oriented x3 [Examination Of The Breasts] : a normal appearance [No Masses] : no breast masses were palpable [Labia Majora] : normal [Labia Minora] : normal [Cystocele] : a cystocele [Uterine Prolapse] : uterine prolapse [Normal] : normal [Uterine Adnexae] : normal

## 2023-03-14 NOTE — PLAN
[FreeTextEntry1] : 71 y/o  postmenopausal F presents for annual\par \par HCM\par f/u pap/hpv\par has appts for mammo, DEXA and colonoscopy\par \par Back pain w pessary\par will follow up with urogyn

## 2023-03-21 ENCOUNTER — NON-APPOINTMENT (OUTPATIENT)
Age: 73
End: 2023-03-21

## 2023-03-21 LAB — CYTOLOGY CVX/VAG DOC THIN PREP: ABNORMAL

## 2023-04-06 ENCOUNTER — APPOINTMENT (OUTPATIENT)
Dept: OBGYN | Facility: CLINIC | Age: 73
End: 2023-04-06
Payer: MEDICARE

## 2023-04-06 VITALS — DIASTOLIC BLOOD PRESSURE: 87 MMHG | SYSTOLIC BLOOD PRESSURE: 191 MMHG

## 2023-04-06 PROCEDURE — 57454 BX/CURETT OF CERVIX W/SCOPE: CPT

## 2023-04-06 NOTE — PROCEDURE
[Colposcopy] : Colposcopy  [Time out performed] : Pre-procedure time out performed.  Patient's name, date of birth and procedure confirmed. [Consent Obtained] : Consent obtained [Risks] : risks [Benefits] : benefits [Alternatives] : alternatives [Patient] : patient [Infection] : infection [Bleeding] : bleeding [ASCUS] : ASCUS [HPV High Risk] : HPV high risk [No Premedication] : no premedication [Colposcopy Adequate] : colposcopy adequate [Pap Performed] : pap not performed [SCI Fully Visualized] : SCI fully visualized [ECC Performed] : ECC performed [Lesion] : lesion seen [Biopsy] : biopsy taken [Hemostasis Obtained] : Hemostasis obtained [Tolerated Well] : the patient tolerated the procedure well [de-identified] : +HPV 18/45 [de-identified] : 2 [de-identified] : mosaicism noted [de-identified] : 7, 11

## 2023-04-14 LAB — CORE LAB BIOPSY: NORMAL

## 2023-04-25 ENCOUNTER — RESULT REVIEW (OUTPATIENT)
Age: 73
End: 2023-04-25

## 2023-04-25 ENCOUNTER — OUTPATIENT (OUTPATIENT)
Dept: OUTPATIENT SERVICES | Facility: HOSPITAL | Age: 73
LOS: 1 days | End: 2023-04-25
Payer: MEDICARE

## 2023-04-25 ENCOUNTER — APPOINTMENT (OUTPATIENT)
Dept: RADIOLOGY | Facility: IMAGING CENTER | Age: 73
End: 2023-04-25
Payer: MEDICARE

## 2023-04-25 ENCOUNTER — APPOINTMENT (OUTPATIENT)
Dept: MAMMOGRAPHY | Facility: IMAGING CENTER | Age: 73
End: 2023-04-25
Payer: MEDICARE

## 2023-04-25 ENCOUNTER — APPOINTMENT (OUTPATIENT)
Dept: ULTRASOUND IMAGING | Facility: IMAGING CENTER | Age: 73
End: 2023-04-25
Payer: MEDICARE

## 2023-04-25 DIAGNOSIS — Z00.8 ENCOUNTER FOR OTHER GENERAL EXAMINATION: ICD-10-CM

## 2023-04-25 PROCEDURE — 77067 SCR MAMMO BI INCL CAD: CPT | Mod: 26

## 2023-04-25 PROCEDURE — 77067 SCR MAMMO BI INCL CAD: CPT

## 2023-04-25 PROCEDURE — 77063 BREAST TOMOSYNTHESIS BI: CPT

## 2023-04-25 PROCEDURE — 76641 ULTRASOUND BREAST COMPLETE: CPT | Mod: 26,50

## 2023-04-25 PROCEDURE — 77080 DXA BONE DENSITY AXIAL: CPT | Mod: 26

## 2023-04-25 PROCEDURE — 77063 BREAST TOMOSYNTHESIS BI: CPT | Mod: 26

## 2023-04-25 PROCEDURE — 77080 DXA BONE DENSITY AXIAL: CPT

## 2023-04-25 PROCEDURE — 76641 ULTRASOUND BREAST COMPLETE: CPT

## 2023-05-01 ENCOUNTER — APPOINTMENT (OUTPATIENT)
Dept: UROGYNECOLOGY | Facility: CLINIC | Age: 73
End: 2023-05-01
Payer: MEDICARE

## 2023-05-01 VITALS
BODY MASS INDEX: 32.78 KG/M2 | HEIGHT: 64 IN | SYSTOLIC BLOOD PRESSURE: 158 MMHG | WEIGHT: 192 LBS | HEART RATE: 60 BPM | OXYGEN SATURATION: 99 % | DIASTOLIC BLOOD PRESSURE: 83 MMHG

## 2023-05-01 DIAGNOSIS — N36.41 HYPERMOBILITY OF URETHRA: ICD-10-CM

## 2023-05-01 DIAGNOSIS — R39.198 OTHER DIFFICULTIES WITH MICTURITION: ICD-10-CM

## 2023-05-01 DIAGNOSIS — N95.0 POSTMENOPAUSAL BLEEDING: ICD-10-CM

## 2023-05-01 PROCEDURE — 99214 OFFICE O/P EST MOD 30 MIN: CPT | Mod: 25

## 2023-05-01 PROCEDURE — 51701 INSERT BLADDER CATHETER: CPT

## 2023-05-01 PROCEDURE — 81003 URINALYSIS AUTO W/O SCOPE: CPT | Mod: QW

## 2023-05-01 RX ORDER — IPRATROPIUM BROMIDE 42 UG/1
0.06 SPRAY NASAL 3 TIMES DAILY
Qty: 1 | Refills: 3 | Status: ACTIVE | COMMUNITY
Start: 2020-11-10 | End: 1900-01-01

## 2023-05-01 NOTE — PROCEDURE
[FreeTextEntry1] : A catheterized urine was performed to rule out urinary tract infection and/or retention.  Discussed that urine dipstick was positive for white blood cells and we will send off for urine culture.

## 2023-05-01 NOTE — HISTORY OF PRESENT ILLNESS
[Vaginal Wall Prolapse] : mild [Unable To Restrain Bowel Movement] : no [Feelings Of Urinary Urgency] : moderate [Urinary Frequency] : moderate [de-identified] : daily [FreeTextEntry3] : every day; to urinate [FreeTextEntry4] : spotting [FreeTextEntry5] : daily [de-identified] : w/ splinting [FreeTextEntry1] : Has had the pessary (RS#5) out for the past two weeks - kept "pushing out by itself" and that the tissue coming down is "the size of an egg". Has had three months of discomfort but now it is no longer keeping it in place. Was previously doing self-care without difficulty. Patient is not currently sexually active and plans to move to [place] in the future.

## 2023-05-01 NOTE — REASON FOR VISIT
[Follow-Up Visit_____] : a follow-up visit for [unfilled] [Family Member] : family member [Pelvic Organ Prolapse] : pelvic organ prolapse [Pessary] : pessary

## 2023-05-01 NOTE — PHYSICAL EXAM
[Aa ____] : Aa [unfilled] [C ____] : C [unfilled] [PB ____] : PB [unfilled] [TVL ____] : TVL  [unfilled] [Ap ____] : Ap [unfilled] [Bp ____] : Bp [unfilled] [D ____] : D [unfilled] [Chaperone Present] : A chaperone was present in the examining room during all aspects of the physical examination [Well developed] : well developed [Well Nourished] : ~L well nourished [Respirations regular] : ~T respiratory rate was regular [Warm and Dry] : was warm and dry to touch [Normal Gait] : gait was normal [Labia Majora] : were normal [Normal] : was normal [Normal Appearance] : general appearance was normal [Atrophy] : atrophy [Rectocele] : a rectocele [Cystocele] : a cystocele [Uterine Prolapse] : uterine prolapse [Ba ____] : Ba [unfilled] [GH ____] : GH [unfilled] [Post Void Residual ____ml] : post void residual was [unfilled] ml [No Acute Distress] : in acute distress [Tenderness] : ~T no ~M abdominal tenderness observed [Distended] : not distended [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

## 2023-05-01 NOTE — DISCUSSION/SUMMARY
[FreeTextEntry1] : I reviewed the above findings with the patient her daughter with visual illustrations. Treatment options for the prolapse were discussed and included doing nothing, Kegel exercises and behavioral modification, a pessary, or surgical correction.Surgically we discussed the abdominal vs the vaginal routes. Abdominally we discussed a hysterectomy and a sacral colpopexy   laparoscopically and robotically.  Vaginally we discussed a vaginal hysterectomy, uterosacral suspension, and anterior/posterior repair. Surgically we discussed hysteropexy as well as the use of biologics.\par \par Patient desires to try another pessary - will make appointment for pessary refitting.  We also discussed going for a pelvic ultrasound due to the postmenopausal bleeding. \par [facility] resources on pelvic organ prolapse and pessary given to patient.  All questions were answered

## 2023-05-02 ENCOUNTER — RESULT REVIEW (OUTPATIENT)
Age: 73
End: 2023-05-02

## 2023-05-02 ENCOUNTER — TRANSCRIPTION ENCOUNTER (OUTPATIENT)
Age: 73
End: 2023-05-02

## 2023-05-02 ENCOUNTER — OUTPATIENT (OUTPATIENT)
Dept: OUTPATIENT SERVICES | Facility: HOSPITAL | Age: 73
LOS: 1 days | End: 2023-05-02
Payer: MEDICARE

## 2023-05-02 ENCOUNTER — APPOINTMENT (OUTPATIENT)
Dept: ULTRASOUND IMAGING | Facility: IMAGING CENTER | Age: 73
End: 2023-05-02
Payer: MEDICARE

## 2023-05-02 DIAGNOSIS — N81.2 INCOMPLETE UTEROVAGINAL PROLAPSE: ICD-10-CM

## 2023-05-02 PROCEDURE — 76856 US EXAM PELVIC COMPLETE: CPT

## 2023-05-02 PROCEDURE — 76830 TRANSVAGINAL US NON-OB: CPT | Mod: 26

## 2023-05-02 PROCEDURE — 76830 TRANSVAGINAL US NON-OB: CPT

## 2023-05-02 PROCEDURE — 76856 US EXAM PELVIC COMPLETE: CPT | Mod: 26

## 2023-05-03 ENCOUNTER — NON-APPOINTMENT (OUTPATIENT)
Age: 73
End: 2023-05-03

## 2023-05-03 LAB
BILIRUB UR QL STRIP: NORMAL
CLARITY UR: CLEAR
COLLECTION METHOD: NORMAL
GLUCOSE UR-MCNC: NORMAL
HCG UR QL: 0.2 EU/DL
HGB UR QL STRIP.AUTO: NORMAL
KETONES UR-MCNC: NORMAL
LEUKOCYTE ESTERASE UR QL STRIP: ABNORMAL
NITRITE UR QL STRIP: NORMAL
PH UR STRIP: 7.5
PROT UR STRIP-MCNC: NORMAL
SP GR UR STRIP: 1.01

## 2023-05-09 LAB
APPEARANCE: CLEAR
BACTERIA: NEGATIVE /HPF
BILIRUBIN URINE: NEGATIVE
BLOOD URINE: NEGATIVE
CAST: 0 /LPF
COLOR: YELLOW
EPITHELIAL CELLS: 0 /HPF
GLUCOSE QUALITATIVE U: NEGATIVE MG/DL
KETONES URINE: NEGATIVE MG/DL
LEUKOCYTE ESTERASE URINE: ABNORMAL
MICROSCOPIC-UA: NORMAL
NITRITE URINE: NEGATIVE
PH URINE: 7.5
PROTEIN URINE: NEGATIVE MG/DL
RED BLOOD CELLS URINE: 1 /HPF
REVIEW: NORMAL
SPECIFIC GRAVITY URINE: 1.01
UROBILINOGEN URINE: 0.2 MG/DL
WHITE BLOOD CELLS URINE: 3 /HPF

## 2023-05-12 ENCOUNTER — APPOINTMENT (OUTPATIENT)
Dept: UROGYNECOLOGY | Facility: CLINIC | Age: 73
End: 2023-05-12
Payer: MEDICARE

## 2023-05-12 PROCEDURE — 99214 OFFICE O/P EST MOD 30 MIN: CPT

## 2023-05-12 PROCEDURE — A4562: CPT

## 2023-05-23 RX ORDER — ALBUTEROL SULFATE 90 UG/1
108 (90 BASE) POWDER, METERED RESPIRATORY (INHALATION)
Qty: 3 | Refills: 3 | Status: ACTIVE | COMMUNITY
Start: 2023-05-23 | End: 1900-01-01

## 2023-05-31 ENCOUNTER — APPOINTMENT (OUTPATIENT)
Dept: UROGYNECOLOGY | Facility: CLINIC | Age: 73
End: 2023-05-31
Payer: MEDICARE

## 2023-05-31 PROCEDURE — A4562: CPT

## 2023-05-31 PROCEDURE — 99214 OFFICE O/P EST MOD 30 MIN: CPT

## 2023-06-13 ENCOUNTER — APPOINTMENT (OUTPATIENT)
Dept: INTERNAL MEDICINE | Facility: CLINIC | Age: 73
End: 2023-06-13
Payer: MEDICARE

## 2023-06-13 VITALS
OXYGEN SATURATION: 99 % | DIASTOLIC BLOOD PRESSURE: 76 MMHG | WEIGHT: 192 LBS | SYSTOLIC BLOOD PRESSURE: 140 MMHG | BODY MASS INDEX: 32.78 KG/M2 | HEIGHT: 64 IN | HEART RATE: 59 BPM

## 2023-06-13 DIAGNOSIS — T78.3XXA ANGIONEUROTIC EDEMA, INITIAL ENCOUNTER: ICD-10-CM

## 2023-06-13 DIAGNOSIS — R22.0 LOCALIZED SWELLING, MASS AND LUMP, HEAD: ICD-10-CM

## 2023-06-13 PROCEDURE — 99214 OFFICE O/P EST MOD 30 MIN: CPT

## 2023-06-13 RX ORDER — FAMOTIDINE 20 MG/1
20 TABLET, FILM COATED ORAL DAILY
Qty: 30 | Refills: 1 | Status: ACTIVE | COMMUNITY
Start: 2023-06-13 | End: 1900-01-01

## 2023-06-13 RX ORDER — METHYLPREDNISOLONE 4 MG/1
4 TABLET ORAL
Qty: 1 | Refills: 0 | Status: ACTIVE | COMMUNITY
Start: 2023-06-13 | End: 1900-01-01

## 2023-06-13 RX ORDER — CETIRIZINE HYDROCHLORIDE 10 MG/1
10 TABLET, FILM COATED ORAL DAILY
Qty: 30 | Refills: 1 | Status: ACTIVE | COMMUNITY
Start: 2023-06-13 | End: 1900-01-01

## 2023-06-18 PROBLEM — T78.3XXA ANGIOEDEMA: Status: ACTIVE | Noted: 2023-06-18

## 2023-06-18 NOTE — REVIEW OF SYSTEMS
[FreeTextEntry2] : Constitutional:  no fever and no chills. \par Eyes:  no discharge. \par HEENT:  no earache. \par Cardiovascular:  no chest pain, no palpitations and no lower extremity edema. \par Respiratory:  no shortness of breath, no wheezing and no cough. \par Gastrointestinal:  no abdominal pain, no nausea and no vomiting. \par Genitourinary:  no dysuria. \par Musculoskeletal:  no joint pain. \par Integumentary:  no itching. \par Neurological:  no headache. \par Psychiatric:  not suicidal. \par Hematologic/Lymphatic:  no easy bleeding. [Discharge] : no discharge [FreeTextEntry4] : see hpi [FreeTextEntry6] : see hpi [de-identified] : see hpi

## 2023-06-18 NOTE — ADDENDUM
[FreeTextEntry1] : 6/16/23 called pt but had to leave a VM. Asked to call us back with current BP and how she feels with Medrol andi.

## 2023-06-18 NOTE — HISTORY OF PRESENT ILLNESS
[FreeTextEntry1] : f/up [de-identified] : NAVNEET CORTEZ  is a 72 year old female  with history of  HTN, asthma, obesity, chronic back and shoulder pain presented today for left side of facial swelling from  yesterday.\par Last seen by Dr. Bell 12/30/22 for CPE. \par Pt came with daughter who brought in copy of [facility] record 9/13/21: Pt has similar sx at that time and ED treated her with Benadryl, famotidine and prednisone for angioedema. Daughter gave her Benadryl and pt's facial swelling has mildly decreased.\par Denied lip, throat swelling, breathing issue or other skin rash. \par Denied fever, chills,CP, SOB, abdominal pain, n/v/c/d.\par Daughter is asking for blood work to find out allergen.

## 2023-06-18 NOTE — PHYSICAL EXAM
[Normal Sclera/Conjunctiva] : normal sclera/conjunctiva [PERRL] : pupils equal round and reactive to light [EOMI] : extraocular movements intact [Normal Oropharynx] : the oropharynx was normal [No Lymphadenopathy] : no lymphadenopathy [de-identified] : WDWN in NAD\par HEENT:  unremarkable\par Neck:  supple, no JVD, no LN\par Lungs:  CTA B/L, no W/R/R\par Heart:  Reg rate, +S1S2, no M/R/G\par Abdomen:  soft, NT, ND, +BS, no masses, no HS-megaly\par Genital: No pubic or genital lesions noted.\par Ext:  no C/C/E\par Neuro:  no focal deficits [de-identified] : Left facial swelling with rash

## 2023-06-18 NOTE — ASSESSMENT
[FreeTextEntry1] : NAVNEET CORTEZ  is a 72 year old female  with history of  HTN, asthma, obesity, chronic back and shoulder pain presented today for one side of facial swelling. \par \par # left facial angioedema\par hold Benazepril as chance of ACE I induced angioedema. \par Pt has had ACE I, Benazepril 40mg po qd that used since 2016.\par Hx of uncontrolled /100 in the past. \par Closely monitor home BP.\par \par Sent Rx for Medrol andi, Zyrtec, famotidine for left side facial swelling.\par Made referral for [facility] for allergen test. \par \par RTO as needed.

## 2023-06-21 ENCOUNTER — APPOINTMENT (OUTPATIENT)
Dept: UROGYNECOLOGY | Facility: CLINIC | Age: 73
End: 2023-06-21
Payer: MEDICARE

## 2023-06-21 DIAGNOSIS — N81.2 INCOMPLETE UTEROVAGINAL PROLAPSE: ICD-10-CM

## 2023-06-21 DIAGNOSIS — N81.11 CYSTOCELE, MIDLINE: ICD-10-CM

## 2023-06-21 PROCEDURE — 99213 OFFICE O/P EST LOW 20 MIN: CPT

## 2023-09-21 ENCOUNTER — APPOINTMENT (OUTPATIENT)
Dept: UROGYNECOLOGY | Facility: CLINIC | Age: 73
End: 2023-09-21